# Patient Record
Sex: MALE | Race: BLACK OR AFRICAN AMERICAN | NOT HISPANIC OR LATINO | Employment: FULL TIME | URBAN - METROPOLITAN AREA
[De-identification: names, ages, dates, MRNs, and addresses within clinical notes are randomized per-mention and may not be internally consistent; named-entity substitution may affect disease eponyms.]

---

## 2023-08-07 NOTE — PROGRESS NOTES
Assessment:       1. Preventative health care    2. Chronic gout without tophus, unspecified cause, unspecified site    3. Sickle cell trait    4. Need for vaccination              Plan:             Yassine was seen today for establish care.    Diagnoses and all orders for this visit:    Preventative health care  -     Hepatitis C Antibody; Future  -     HIV 1/2 Ag/Ab (4th Gen); Future  -     RPR; Future  -     Hemoglobin A1C; Future  -     Lipid Panel; Future  -     CBC Auto Differential; Future  -     Basic Metabolic Panel; Future  -     Hepatic Function Panel; Future  -     TSH; Future    Chronic gout without tophus, unspecified cause, unspecified site  -     Uric Acid; Future    Sickle cell trait    Need for vaccination  -- I reviewed the patient vaccine history and provided the risk benefits and side effects of the vaccine.   -- I provided the patient a VIS sheet on the vaccine.   -     Tdap Vaccine              Subjective:       Patient ID: Yassine Cote Sr is a 28 y.o. male.    Chief Complaint:   Establish Care      HPI    #Last visit/Previous Provider  This patient is new to me  Previously seen by No primary care provider on file.  Last visit was > 3yr  Last CPE was >3yr      Link to History           #Interim Hx    No urgent care or ED/hospitalization    #Concerns Today  Est care     Gout  3-4 yrs ago staretd with gout flares , has not had any work up , has gone to  for inj or steroid packs  Elevated bp   Not on meds . Does not have bp cuff at home   Heartburn  Reports history but never dx or dietary changes      #Chronic Problems    Patient Active Problem List   Diagnosis    Sickle cell trait    Chronic gout without tophus           Health Maintenance Due   Topic Date Due    Hepatitis C Screening  Never done    Lipid Panel  Never done    Pneumococcal Vaccines (Age 0-64) (1 - PCV) Never done    HIV Screening  Never done    COVID-19 Vaccine (2 - Pfizer series) 10/02/2021       Health Maintenance  "Topics with due status: Not Due       Topic Last Completion Date    TETANUS VACCINE 08/08/2023    Influenza Vaccine Not Due         Tobacco Use: Low Risk  (8/8/2023)    Patient History     Smoking Tobacco Use: Never     Smokeless Tobacco Use: Never     Passive Exposure: Never         Review of Systems   All other systems reviewed and are negative.        Objective:   /80 (BP Location: Right arm, Patient Position: Sitting, BP Method: Large (Manual))   Pulse 80   Ht 6' 2" (1.88 m)   Wt (!) 142 kg (313 lb 0.9 oz)   SpO2 98%   BMI 40.19 kg/m²     Vitals 8/8/2023   Height 74   Weight (lbs) 313.05   BMI (kg/m2) 40.2          Physical Exam  Vitals and nursing note reviewed.   Constitutional:       General: He is not in acute distress.     Appearance: He is well-developed. He is not ill-appearing, toxic-appearing or diaphoretic.   HENT:      Head: Normocephalic.   Eyes:      Conjunctiva/sclera: Conjunctivae normal.   Cardiovascular:      Rate and Rhythm: Normal rate and regular rhythm.      Heart sounds: Normal heart sounds. No murmur heard.     No friction rub. No gallop.   Pulmonary:      Effort: Pulmonary effort is normal. No respiratory distress.   Abdominal:      General: There is no distension.      Palpations: Abdomen is soft.   Neurological:      General: No focal deficit present.      Mental Status: He is alert and oriented to person, place, and time.             ASCVD  The ASCVD Risk score (Dano DK, et al., 2019) failed to calculate for the following reasons:    The 2019 ASCVD risk score is only valid for ages 40 to 79    Basic Labs  BMP  No results found for: "NA", "K", "CL", "CO2", "BUN", "CREATININE", "CALCIUM", "ANIONGAP", "ESTGFRAFRICA", "EGFRNONAA"  No results found for: "ALT", "AST", "GGT", "ALKPHOS", "BILITOT"    No results found for: "TSH"    No results found for: "WBC", "HGB", "HCT", "MCV", "PLT"        STD  No results found for: "HIV1X2", "ASF15GKHG"  No results found for: "RPR"  No results " "found for: "HAV", "HEPAIGM", "HEPBIGM", "HEPBCAB", "HBEAG", "HEPCAB"  No results found for: "LABNGO", "LABCHLA"      Lipids  No results found for: "CHOL"  No results found for: "HDL"  No results found for: "LDLCALC"  No results found for: "TRIG"  No results found for: "CHOLHDL"    DM  No results found for: "LABA1C", "HGBA1C"    PSA  No results found for: "PSA", "PSATOTAL", "PSAFREE", "PSAFREEPCT"        Future Appointments   Date Time Provider Department [place]   8/8/2023  3:30 PM [facility]AV[facility]   8/22/2023  2:00 PM INJECTION, HEAVEN [facility] [place]   9/5/2023  3:40 PM Mikey Morelos III, MD [facility]  [place]         Disclaimer:  This note has been generated using voice-recognition software. There may be grammatical or spelling errors that have been missed during proof-reading     "

## 2023-08-08 ENCOUNTER — LAB VISIT (OUTPATIENT)
Dept: LAB | Facility: HOSPITAL | Age: 28
End: 2023-08-08
Attending: INTERNAL MEDICINE
Payer: COMMERCIAL

## 2023-08-08 ENCOUNTER — OFFICE VISIT (OUTPATIENT)
Dept: INTERNAL MEDICINE | Facility: CLINIC | Age: 28
End: 2023-08-08
Payer: COMMERCIAL

## 2023-08-08 VITALS
SYSTOLIC BLOOD PRESSURE: 132 MMHG | OXYGEN SATURATION: 98 % | HEART RATE: 80 BPM | DIASTOLIC BLOOD PRESSURE: 80 MMHG | BODY MASS INDEX: 40.18 KG/M2 | WEIGHT: 313.06 LBS | HEIGHT: 74 IN

## 2023-08-08 DIAGNOSIS — Z00.00 PREVENTATIVE HEALTH CARE: Primary | ICD-10-CM

## 2023-08-08 DIAGNOSIS — M1A.9XX0 CHRONIC GOUT WITHOUT TOPHUS, UNSPECIFIED CAUSE, UNSPECIFIED SITE: ICD-10-CM

## 2023-08-08 DIAGNOSIS — Z00.00 PREVENTATIVE HEALTH CARE: ICD-10-CM

## 2023-08-08 DIAGNOSIS — Z23 NEED FOR VACCINATION: ICD-10-CM

## 2023-08-08 DIAGNOSIS — D57.3 SICKLE CELL TRAIT: ICD-10-CM

## 2023-08-08 PROCEDURE — 3079F DIAST BP 80-89 MM HG: CPT | Mod: CPTII,S$GLB,, | Performed by: INTERNAL MEDICINE

## 2023-08-08 PROCEDURE — 90715 TDAP VACCINE GREATER THAN OR EQUAL TO 7YO IM: ICD-10-PCS | Mod: S$GLB,,, | Performed by: INTERNAL MEDICINE

## 2023-08-08 PROCEDURE — 99385 PREV VISIT NEW AGE 18-39: CPT | Mod: 25,S$GLB,, | Performed by: INTERNAL MEDICINE

## 2023-08-08 PROCEDURE — 90715 TDAP VACCINE 7 YRS/> IM: CPT | Mod: S$GLB,,, | Performed by: INTERNAL MEDICINE

## 2023-08-08 PROCEDURE — 3008F BODY MASS INDEX DOCD: CPT | Mod: CPTII,S$GLB,, | Performed by: INTERNAL MEDICINE

## 2023-08-08 PROCEDURE — 1160F PR REVIEW ALL MEDS BY PRESCRIBER/CLIN PHARMACIST DOCUMENTED: ICD-10-PCS | Mod: CPTII,S$GLB,, | Performed by: INTERNAL MEDICINE

## 2023-08-08 PROCEDURE — 1160F RVW MEDS BY RX/DR IN RCRD: CPT | Mod: CPTII,S$GLB,, | Performed by: INTERNAL MEDICINE

## 2023-08-08 PROCEDURE — 3075F PR MOST RECENT SYSTOLIC BLOOD PRESS GE 130-139MM HG: ICD-10-PCS | Mod: CPTII,S$GLB,, | Performed by: INTERNAL MEDICINE

## 2023-08-08 PROCEDURE — 86592 SYPHILIS TEST NON-TREP QUAL: CPT | Performed by: INTERNAL MEDICINE

## 2023-08-08 PROCEDURE — 80076 HEPATIC FUNCTION PANEL: CPT | Performed by: INTERNAL MEDICINE

## 2023-08-08 PROCEDURE — 80061 LIPID PANEL: CPT | Performed by: INTERNAL MEDICINE

## 2023-08-08 PROCEDURE — 84550 ASSAY OF BLOOD/URIC ACID: CPT | Performed by: INTERNAL MEDICINE

## 2023-08-08 PROCEDURE — 90471 IMMUNIZATION ADMIN: CPT | Mod: S$GLB,,, | Performed by: INTERNAL MEDICINE

## 2023-08-08 PROCEDURE — 1159F PR MEDICATION LIST DOCUMENTED IN MEDICAL RECORD: ICD-10-PCS | Mod: CPTII,S$GLB,, | Performed by: INTERNAL MEDICINE

## 2023-08-08 PROCEDURE — 86803 HEPATITIS C AB TEST: CPT | Performed by: INTERNAL MEDICINE

## 2023-08-08 PROCEDURE — 85025 COMPLETE CBC W/AUTO DIFF WBC: CPT | Performed by: INTERNAL MEDICINE

## 2023-08-08 PROCEDURE — 99999 PR PBB SHADOW E&M-NEW PATIENT-LVL IV: ICD-10-PCS | Mod: PBBFAC,,, | Performed by: INTERNAL MEDICINE

## 2023-08-08 PROCEDURE — 3008F PR BODY MASS INDEX (BMI) DOCUMENTED: ICD-10-PCS | Mod: CPTII,S$GLB,, | Performed by: INTERNAL MEDICINE

## 2023-08-08 PROCEDURE — 3079F PR MOST RECENT DIASTOLIC BLOOD PRESSURE 80-89 MM HG: ICD-10-PCS | Mod: CPTII,S$GLB,, | Performed by: INTERNAL MEDICINE

## 2023-08-08 PROCEDURE — 3075F SYST BP GE 130 - 139MM HG: CPT | Mod: CPTII,S$GLB,, | Performed by: INTERNAL MEDICINE

## 2023-08-08 PROCEDURE — 84443 ASSAY THYROID STIM HORMONE: CPT | Performed by: INTERNAL MEDICINE

## 2023-08-08 PROCEDURE — 83036 HEMOGLOBIN GLYCOSYLATED A1C: CPT | Performed by: INTERNAL MEDICINE

## 2023-08-08 PROCEDURE — 99999 PR PBB SHADOW E&M-NEW PATIENT-LVL IV: CPT | Mod: PBBFAC,,, | Performed by: INTERNAL MEDICINE

## 2023-08-08 PROCEDURE — 90471 TDAP VACCINE GREATER THAN OR EQUAL TO 7YO IM: ICD-10-PCS | Mod: S$GLB,,, | Performed by: INTERNAL MEDICINE

## 2023-08-08 PROCEDURE — 80048 BASIC METABOLIC PNL TOTAL CA: CPT | Performed by: INTERNAL MEDICINE

## 2023-08-08 PROCEDURE — 1159F MED LIST DOCD IN RCRD: CPT | Mod: CPTII,S$GLB,, | Performed by: INTERNAL MEDICINE

## 2023-08-08 PROCEDURE — 99385 PR PREVENTIVE VISIT,NEW,18-39: ICD-10-PCS | Mod: 25,S$GLB,, | Performed by: INTERNAL MEDICINE

## 2023-08-08 PROCEDURE — 87389 HIV-1 AG W/HIV-1&-2 AB AG IA: CPT | Performed by: INTERNAL MEDICINE

## 2023-08-08 PROCEDURE — 36415 COLL VENOUS BLD VENIPUNCTURE: CPT | Mod: PO | Performed by: INTERNAL MEDICINE

## 2023-08-08 NOTE — PATIENT INSTRUCTIONS
We were happy to see you today    For your Testing  Please have your labs and/or imaging test done today         For your Medication   Please see the diets attached and start tracking the blood pressure   For more information about side effects please visit medlineplus.gov          For your Vaccinations  We gave your the following vaccines  Tetanus       Please return to clinic in    Follow up for Labs Today, 4 week for physical, , Blood pressure log.        Extra resources

## 2023-08-09 LAB
ALBUMIN SERPL BCP-MCNC: 3.5 G/DL (ref 3.5–5.2)
ALP SERPL-CCNC: 71 U/L (ref 55–135)
ALT SERPL W/O P-5'-P-CCNC: 56 U/L (ref 10–44)
ANION GAP SERPL CALC-SCNC: 9 MMOL/L (ref 8–16)
AST SERPL-CCNC: 18 U/L (ref 10–40)
BASOPHILS # BLD AUTO: 0.08 K/UL (ref 0–0.2)
BASOPHILS NFR BLD: 0.4 % (ref 0–1.9)
BILIRUB DIRECT SERPL-MCNC: 0.2 MG/DL (ref 0.1–0.3)
BILIRUB SERPL-MCNC: 0.4 MG/DL (ref 0.1–1)
BUN SERPL-MCNC: 21 MG/DL (ref 6–20)
CALCIUM SERPL-MCNC: 9.3 MG/DL (ref 8.7–10.5)
CHLORIDE SERPL-SCNC: 102 MMOL/L (ref 95–110)
CHOLEST SERPL-MCNC: 198 MG/DL (ref 120–199)
CHOLEST/HDLC SERPL: 4 {RATIO} (ref 2–5)
CO2 SERPL-SCNC: 27 MMOL/L (ref 23–29)
CREAT SERPL-MCNC: 0.9 MG/DL (ref 0.5–1.4)
DIFFERENTIAL METHOD: ABNORMAL
EOSINOPHIL # BLD AUTO: 0 K/UL (ref 0–0.5)
EOSINOPHIL NFR BLD: 0 % (ref 0–8)
ERYTHROCYTE [DISTWIDTH] IN BLOOD BY AUTOMATED COUNT: 13.2 % (ref 11.5–14.5)
EST. GFR  (NO RACE VARIABLE): >60 ML/MIN/1.73 M^2
ESTIMATED AVG GLUCOSE: 117 MG/DL (ref 68–131)
GLUCOSE SERPL-MCNC: 55 MG/DL (ref 70–110)
HBA1C MFR BLD: 5.7 % (ref 4–5.6)
HCT VFR BLD AUTO: 48.4 % (ref 40–54)
HCV AB SERPL QL IA: NORMAL
HDLC SERPL-MCNC: 50 MG/DL (ref 40–75)
HDLC SERPL: 25.3 % (ref 20–50)
HGB BLD-MCNC: 16 G/DL (ref 14–18)
HIV 1+2 AB+HIV1 P24 AG SERPL QL IA: NORMAL
IMM GRANULOCYTES # BLD AUTO: 0.56 K/UL (ref 0–0.04)
IMM GRANULOCYTES NFR BLD AUTO: 2.7 % (ref 0–0.5)
LDLC SERPL CALC-MCNC: 122.8 MG/DL (ref 63–159)
LYMPHOCYTES # BLD AUTO: 3.8 K/UL (ref 1–4.8)
LYMPHOCYTES NFR BLD: 18.6 % (ref 18–48)
MCH RBC QN AUTO: 28 PG (ref 27–31)
MCHC RBC AUTO-ENTMCNC: 33.1 G/DL (ref 32–36)
MCV RBC AUTO: 85 FL (ref 82–98)
MONOCYTES # BLD AUTO: 1.6 K/UL (ref 0.3–1)
MONOCYTES NFR BLD: 7.6 % (ref 4–15)
NEUTROPHILS # BLD AUTO: 14.5 K/UL (ref 1.8–7.7)
NEUTROPHILS NFR BLD: 70.7 % (ref 38–73)
NONHDLC SERPL-MCNC: 148 MG/DL
NRBC BLD-RTO: 0 /100 WBC
PLATELET # BLD AUTO: 411 K/UL (ref 150–450)
PMV BLD AUTO: 8.7 FL (ref 9.2–12.9)
POTASSIUM SERPL-SCNC: 4 MMOL/L (ref 3.5–5.1)
PROT SERPL-MCNC: 7.3 G/DL (ref 6–8.4)
RBC # BLD AUTO: 5.72 M/UL (ref 4.6–6.2)
RPR SER QL: NORMAL
SODIUM SERPL-SCNC: 138 MMOL/L (ref 136–145)
TRIGL SERPL-MCNC: 126 MG/DL (ref 30–150)
TSH SERPL DL<=0.005 MIU/L-ACNC: 1.58 UIU/ML (ref 0.4–4)
URATE SERPL-MCNC: 7.6 MG/DL (ref 3.4–7)
WBC # BLD AUTO: 20.46 K/UL (ref 3.9–12.7)

## 2023-08-21 ENCOUNTER — PATIENT MESSAGE (OUTPATIENT)
Dept: RESEARCH | Facility: HOSPITAL | Age: 28
End: 2023-08-21
Payer: COMMERCIAL

## 2023-08-22 ENCOUNTER — CLINICAL SUPPORT (OUTPATIENT)
Dept: FAMILY MEDICINE | Facility: CLINIC | Age: 28
End: 2023-08-22
Payer: COMMERCIAL

## 2023-08-22 VITALS — DIASTOLIC BLOOD PRESSURE: 88 MMHG | SYSTOLIC BLOOD PRESSURE: 138 MMHG

## 2023-08-22 DIAGNOSIS — I10 HYPERTENSION, UNSPECIFIED TYPE: Primary | ICD-10-CM

## 2023-08-22 PROCEDURE — 99999 PR PBB SHADOW E&M-EST. PATIENT-LVL II: CPT | Mod: PBBFAC,,,

## 2023-08-22 PROCEDURE — 99999 PR PBB SHADOW E&M-EST. PATIENT-LVL II: ICD-10-PCS | Mod: PBBFAC,,,

## 2023-08-22 NOTE — PROGRESS NOTES
Yassine Cote Sr 28 y.o. male is here today for Blood Pressure check.   History of HTN no.    Review of patient's allergies indicates:  No Known Allergies  Creatinine   Date Value Ref Range Status   08/08/2023 0.9 0.5 - 1.4 mg/dL Final     Sodium   Date Value Ref Range Status   08/08/2023 138 136 - 145 mmol/L Final     Potassium   Date Value Ref Range Status   08/08/2023 4.0 3.5 - 5.1 mmol/L Final   ]  Patient denies taking blood pressure medications on a regular basis at the same time of the day.   No current outpatient medications on file.  Does patient have record of home blood pressure readings no. Readings have been averaging N/A.   Last dose of blood pressure medication was taken at N/A.  Patient is asymptomatic.   Complains of N/A.    BP: 138/88 ,   .    Blood pressure reading after 15 minutes was 138/88, Pulse 80.  Dr. Morelos notified.

## 2023-09-04 PROBLEM — R73.03 PREDIABETES: Status: ACTIVE | Noted: 2023-09-04

## 2023-09-04 NOTE — PROGRESS NOTES
Assessment:       1. Prediabetes    2. Leukocytosis, unspecified type    3. Elevated LFTs    4. Chronic gout without tophus, unspecified cause, unspecified site          Plan:         Yassine was seen today for follow-up.    Diagnoses and all orders for this visit:    Prediabetes  new disease state  I reviewed the natural history of the disorder and possible complications  Reviewed the consequence of non-adherence to treatment regiment  I have reviewed lifestyle modification to achieve/maintain goals  Reviewed goals for tx  reviewed signs and symptoms that should prompt return to provider or evaluation in the ED   Patient will follow up in 6 months   -     Basic Metabolic Panel; Standing  -     Hemoglobin A1C; Standing  -     Lipid Panel; Standing    Leukocytosis, unspecified type    New   Uncontrolled  Signs, symptoms consistent with unclear etiology , possible meication side effect pt on steroid for gout   history or pyhsical exam do not suggest acute infections   I provided instruction on supportive care measures   Prior tesing reviewed and new testing was was given   reviewed signs and symptoms that should prompt return to provider or evaluation in the ED  -     CBC Auto Differential; Future  -     Pathologist Interpretation Differential; Future  -     CBC Without Differential; Standing    Elevated LFTs    New   Uncontrolled  Signs, symptoms consistent with unclear etiology but possible etoh related pt has >6 drinks on weekends   history or pyhsical exam do not suggest ADLF  DDx includes but not limited to fatty liver,   I provided instruction on supportive care measures   Prior tesing reviewed and new testing was was given   reviewed signs and symptoms that should prompt return to provider or evaluation in the ED  -     Comprehensive Metabolic Panel; Future  -     Hepatic Function Panel; Standing    Chronic gout without tophus, unspecified cause, unspecified site  -     Comprehensive Metabolic Panel; Future  -    "  Uric Acid; Standing  -     colchicine, gout, (COLCRYS) 0.6 mg tablet; Take 1 tablet (0.6 mg total) by mouth 2 (two) times daily. Take 2 tabs at the first sign of symptoms. Take one tab twice a day til symptoms resolution          Subjective:       Patient ID: Yassine Cote Sr is a 28 y.o. male.    Chief Complaint: Follow-up        Interim Hx  none    Concerns today  Review results     Chronic problems     Patient Active Problem List   Diagnosis    Sickle cell trait    Chronic gout without tophus    Prediabetes         HPI    Review of Systems   All other systems reviewed and are negative.            Health Maintenance Due   Topic Date Due    Pneumococcal Vaccines (Age 0-64) (1 - PCV) Never done    COVID-19 Vaccine (2 - Pfizer series) 10/02/2021    Influenza Vaccine (1) Never done         Objective:     /80 (BP Location: Right arm, Patient Position: Sitting, BP Method: Large (Manual))   Pulse 77   Ht 6' 2" (1.88 m)   Wt (!) 142.8 kg (314 lb 13.1 oz)   SpO2 98%   BMI 40.42 kg/m²         9/5/2023     3:48 PM 8/8/2023     2:31 PM   Vitals   Height 6' 2" (1.88 m) 6' 2" (1.88 m)   Weight (lbs) 314.82 313.05   BMI (kg/m2) 40.4 40.2              Physical Exam  Vitals and nursing note reviewed.   Constitutional:       General: He is not in acute distress.     Appearance: He is well-developed. He is not ill-appearing, toxic-appearing or diaphoretic.   HENT:      Head: Normocephalic.   Eyes:      Conjunctiva/sclera: Conjunctivae normal.   Cardiovascular:      Rate and Rhythm: Normal rate and regular rhythm.      Heart sounds: Normal heart sounds. No murmur heard.     No friction rub. No gallop.   Pulmonary:      Effort: Pulmonary effort is normal. No respiratory distress.   Abdominal:      General: There is no distension.      Palpations: Abdomen is soft.   Neurological:      General: No focal deficit present.      Mental Status: He is alert and oriented to person, place, and time.           No results " found for this or any previous visit (from the past 336 hour(s)).    Future Appointments   Date Time Provider Department Center   3/1/2024  8:00 AM LAB, HEAVEN [facility] [place]   3/5/2024  2:20 PM Mikey Morelos III, MD [facility] [place]           Disclaimer:  This note has been generated using voice-recognition software. There may be grammatical or spelling errors that have been missed during proof-reading the

## 2023-09-05 ENCOUNTER — OFFICE VISIT (OUTPATIENT)
Dept: INTERNAL MEDICINE | Facility: CLINIC | Age: 28
End: 2023-09-05
Payer: COMMERCIAL

## 2023-09-05 ENCOUNTER — LAB VISIT (OUTPATIENT)
Dept: LAB | Facility: HOSPITAL | Age: 28
End: 2023-09-05
Attending: INTERNAL MEDICINE
Payer: COMMERCIAL

## 2023-09-05 VITALS
HEIGHT: 74 IN | OXYGEN SATURATION: 98 % | DIASTOLIC BLOOD PRESSURE: 80 MMHG | WEIGHT: 314.81 LBS | BODY MASS INDEX: 40.4 KG/M2 | HEART RATE: 77 BPM | SYSTOLIC BLOOD PRESSURE: 132 MMHG

## 2023-09-05 DIAGNOSIS — R79.89 ELEVATED LFTS: ICD-10-CM

## 2023-09-05 DIAGNOSIS — M1A.9XX0 CHRONIC GOUT WITHOUT TOPHUS, UNSPECIFIED CAUSE, UNSPECIFIED SITE: ICD-10-CM

## 2023-09-05 DIAGNOSIS — R73.03 PREDIABETES: Primary | ICD-10-CM

## 2023-09-05 DIAGNOSIS — D72.829 LEUKOCYTOSIS, UNSPECIFIED TYPE: ICD-10-CM

## 2023-09-05 LAB
ALBUMIN SERPL BCP-MCNC: 3.8 G/DL (ref 3.5–5.2)
ALP SERPL-CCNC: 67 U/L (ref 55–135)
ALT SERPL W/O P-5'-P-CCNC: 21 U/L (ref 10–44)
ANION GAP SERPL CALC-SCNC: 11 MMOL/L (ref 8–16)
AST SERPL-CCNC: 17 U/L (ref 10–40)
BILIRUB SERPL-MCNC: 0.3 MG/DL (ref 0.1–1)
BUN SERPL-MCNC: 13 MG/DL (ref 6–20)
CALCIUM SERPL-MCNC: 8.8 MG/DL (ref 8.7–10.5)
CHLORIDE SERPL-SCNC: 105 MMOL/L (ref 95–110)
CO2 SERPL-SCNC: 23 MMOL/L (ref 23–29)
CREAT SERPL-MCNC: 1.1 MG/DL (ref 0.5–1.4)
EST. GFR  (NO RACE VARIABLE): >60 ML/MIN/1.73 M^2
GLUCOSE SERPL-MCNC: 92 MG/DL (ref 70–110)
PATH REV BLD -IMP: NORMAL
POTASSIUM SERPL-SCNC: 4.1 MMOL/L (ref 3.5–5.1)
PROT SERPL-MCNC: 7.2 G/DL (ref 6–8.4)
SODIUM SERPL-SCNC: 139 MMOL/L (ref 136–145)

## 2023-09-05 PROCEDURE — 1159F PR MEDICATION LIST DOCUMENTED IN MEDICAL RECORD: ICD-10-PCS | Mod: CPTII,S$GLB,, | Performed by: INTERNAL MEDICINE

## 2023-09-05 PROCEDURE — 3075F PR MOST RECENT SYSTOLIC BLOOD PRESS GE 130-139MM HG: ICD-10-PCS | Mod: CPTII,S$GLB,, | Performed by: INTERNAL MEDICINE

## 2023-09-05 PROCEDURE — 99214 PR OFFICE/OUTPT VISIT, EST, LEVL IV, 30-39 MIN: ICD-10-PCS | Mod: S$GLB,,, | Performed by: INTERNAL MEDICINE

## 2023-09-05 PROCEDURE — 3079F PR MOST RECENT DIASTOLIC BLOOD PRESSURE 80-89 MM HG: ICD-10-PCS | Mod: CPTII,S$GLB,, | Performed by: INTERNAL MEDICINE

## 2023-09-05 PROCEDURE — 36415 COLL VENOUS BLD VENIPUNCTURE: CPT | Mod: PO | Performed by: INTERNAL MEDICINE

## 2023-09-05 PROCEDURE — 1159F MED LIST DOCD IN RCRD: CPT | Mod: CPTII,S$GLB,, | Performed by: INTERNAL MEDICINE

## 2023-09-05 PROCEDURE — 99999 PR PBB SHADOW E&M-EST. PATIENT-LVL IV: CPT | Mod: PBBFAC,,, | Performed by: INTERNAL MEDICINE

## 2023-09-05 PROCEDURE — 3075F SYST BP GE 130 - 139MM HG: CPT | Mod: CPTII,S$GLB,, | Performed by: INTERNAL MEDICINE

## 2023-09-05 PROCEDURE — 99999 PR PBB SHADOW E&M-EST. PATIENT-LVL IV: ICD-10-PCS | Mod: PBBFAC,,, | Performed by: INTERNAL MEDICINE

## 2023-09-05 PROCEDURE — 3044F PR MOST RECENT HEMOGLOBIN A1C LEVEL <7.0%: ICD-10-PCS | Mod: CPTII,S$GLB,, | Performed by: INTERNAL MEDICINE

## 2023-09-05 PROCEDURE — 3079F DIAST BP 80-89 MM HG: CPT | Mod: CPTII,S$GLB,, | Performed by: INTERNAL MEDICINE

## 2023-09-05 PROCEDURE — 3044F HG A1C LEVEL LT 7.0%: CPT | Mod: CPTII,S$GLB,, | Performed by: INTERNAL MEDICINE

## 2023-09-05 PROCEDURE — 85025 COMPLETE CBC W/AUTO DIFF WBC: CPT | Performed by: INTERNAL MEDICINE

## 2023-09-05 PROCEDURE — 3008F BODY MASS INDEX DOCD: CPT | Mod: CPTII,S$GLB,, | Performed by: INTERNAL MEDICINE

## 2023-09-05 PROCEDURE — 85060 PATHOLOGIST REVIEW: ICD-10-PCS | Mod: ,,, | Performed by: PATHOLOGY

## 2023-09-05 PROCEDURE — 80053 COMPREHEN METABOLIC PANEL: CPT | Performed by: INTERNAL MEDICINE

## 2023-09-05 PROCEDURE — 99214 OFFICE O/P EST MOD 30 MIN: CPT | Mod: S$GLB,,, | Performed by: INTERNAL MEDICINE

## 2023-09-05 PROCEDURE — 3008F PR BODY MASS INDEX (BMI) DOCUMENTED: ICD-10-PCS | Mod: CPTII,S$GLB,, | Performed by: INTERNAL MEDICINE

## 2023-09-05 PROCEDURE — 85060 BLOOD SMEAR INTERPRETATION: CPT | Mod: ,,, | Performed by: PATHOLOGY

## 2023-09-05 RX ORDER — COLCHICINE 0.6 MG/1
0.6 TABLET ORAL 2 TIMES DAILY
Qty: 60 TABLET | Refills: 0 | Status: SHIPPED | OUTPATIENT
Start: 2023-09-05

## 2023-09-05 NOTE — PATIENT INSTRUCTIONS
We were happy to see you today    For your Testing  Please have your labs and/or imaging test done  today and prior to next visit         For your Medication   We sent colchicine to pharmacy   For more information about side effects please visit medlineplus.gov        For your Referrals  None             Please return to clinic in    Follow up for LABS Today, 6 months Office Visit with prelabs.        Extra resources

## 2023-09-06 LAB
BASOPHILS # BLD AUTO: 0.07 K/UL (ref 0–0.2)
BASOPHILS NFR BLD: 1.2 % (ref 0–1.9)
DIFFERENTIAL METHOD: ABNORMAL
EOSINOPHIL # BLD AUTO: 0.1 K/UL (ref 0–0.5)
EOSINOPHIL NFR BLD: 1.4 % (ref 0–8)
ERYTHROCYTE [DISTWIDTH] IN BLOOD BY AUTOMATED COUNT: 12.1 % (ref 11.5–14.5)
HCT VFR BLD AUTO: 44.9 % (ref 40–54)
HGB BLD-MCNC: 14.8 G/DL (ref 14–18)
IMM GRANULOCYTES # BLD AUTO: 0.01 K/UL (ref 0–0.04)
IMM GRANULOCYTES NFR BLD AUTO: 0.2 % (ref 0–0.5)
LYMPHOCYTES # BLD AUTO: 2.5 K/UL (ref 1–4.8)
LYMPHOCYTES NFR BLD: 42.9 % (ref 18–48)
MCH RBC QN AUTO: 27.7 PG (ref 27–31)
MCHC RBC AUTO-ENTMCNC: 33 G/DL (ref 32–36)
MCV RBC AUTO: 84 FL (ref 82–98)
MONOCYTES # BLD AUTO: 0.6 K/UL (ref 0.3–1)
MONOCYTES NFR BLD: 10 % (ref 4–15)
NEUTROPHILS # BLD AUTO: 2.6 K/UL (ref 1.8–7.7)
NEUTROPHILS NFR BLD: 44.3 % (ref 38–73)
NRBC BLD-RTO: 0 /100 WBC
PLATELET # BLD AUTO: 331 K/UL (ref 150–450)
PMV BLD AUTO: 9.1 FL (ref 9.2–12.9)
RBC # BLD AUTO: 5.34 M/UL (ref 4.6–6.2)
WBC # BLD AUTO: 5.78 K/UL (ref 3.9–12.7)

## 2023-09-07 LAB — PATH REV BLD -IMP: NORMAL

## 2024-03-04 PROBLEM — E66.813 CLASS 3 OBESITY: Status: ACTIVE | Noted: 2024-03-04

## 2024-03-04 PROBLEM — E66.01 CLASS 3 OBESITY: Status: ACTIVE | Noted: 2024-03-04

## 2024-03-07 ENCOUNTER — OFFICE VISIT (OUTPATIENT)
Dept: INTERNAL MEDICINE | Facility: CLINIC | Age: 29
End: 2024-03-07
Payer: COMMERCIAL

## 2024-03-07 ENCOUNTER — HOSPITAL ENCOUNTER (OUTPATIENT)
Dept: RADIOLOGY | Facility: HOSPITAL | Age: 29
Discharge: HOME | End: 2024-03-07
Attending: INTERNAL MEDICINE
Payer: COMMERCIAL

## 2024-03-07 ENCOUNTER — LAB VISIT (OUTPATIENT)
Dept: LAB | Facility: HOSPITAL | Age: 29
End: 2024-03-07
Attending: INTERNAL MEDICINE
Payer: COMMERCIAL

## 2024-03-07 VITALS
SYSTOLIC BLOOD PRESSURE: 126 MMHG | DIASTOLIC BLOOD PRESSURE: 86 MMHG | HEART RATE: 89 BPM | HEIGHT: 74 IN | BODY MASS INDEX: 40.43 KG/M2 | WEIGHT: 315 LBS | OXYGEN SATURATION: 95 %

## 2024-03-07 DIAGNOSIS — D57.3 SICKLE CELL TRAIT: ICD-10-CM

## 2024-03-07 DIAGNOSIS — M79.646 PAIN OF MIDDLE FINGER: ICD-10-CM

## 2024-03-07 DIAGNOSIS — Z00.00 PREVENTATIVE HEALTH CARE: ICD-10-CM

## 2024-03-07 DIAGNOSIS — S62.663A CLOSED NONDISPLACED FRACTURE OF DISTAL PHALANX OF LEFT MIDDLE FINGER, INITIAL ENCOUNTER: ICD-10-CM

## 2024-03-07 DIAGNOSIS — Z23 NEED FOR VACCINATION: ICD-10-CM

## 2024-03-07 DIAGNOSIS — Z00.00 PREVENTATIVE HEALTH CARE: Primary | ICD-10-CM

## 2024-03-07 DIAGNOSIS — E66.01 CLASS 3 OBESITY: ICD-10-CM

## 2024-03-07 LAB
ALBUMIN SERPL BCP-MCNC: 3.9 G/DL (ref 3.5–5.2)
ALP SERPL-CCNC: 77 U/L (ref 55–135)
ALT SERPL W/O P-5'-P-CCNC: 42 U/L (ref 10–44)
ANION GAP SERPL CALC-SCNC: 10 MMOL/L (ref 8–16)
AST SERPL-CCNC: 23 U/L (ref 10–40)
BASOPHILS # BLD AUTO: 0.07 K/UL (ref 0–0.2)
BASOPHILS NFR BLD: 1.3 % (ref 0–1.9)
BILIRUB DIRECT SERPL-MCNC: 0.2 MG/DL (ref 0.1–0.3)
BILIRUB SERPL-MCNC: 0.4 MG/DL (ref 0.1–1)
BUN SERPL-MCNC: 15 MG/DL (ref 6–20)
CALCIUM SERPL-MCNC: 9.4 MG/DL (ref 8.7–10.5)
CHLORIDE SERPL-SCNC: 107 MMOL/L (ref 95–110)
CHOLEST SERPL-MCNC: 160 MG/DL (ref 120–199)
CHOLEST/HDLC SERPL: 5 {RATIO} (ref 2–5)
CO2 SERPL-SCNC: 24 MMOL/L (ref 23–29)
CREAT SERPL-MCNC: 1.2 MG/DL (ref 0.5–1.4)
DIFFERENTIAL METHOD BLD: NORMAL
EOSINOPHIL # BLD AUTO: 0.1 K/UL (ref 0–0.5)
EOSINOPHIL NFR BLD: 2.2 % (ref 0–8)
ERYTHROCYTE [DISTWIDTH] IN BLOOD BY AUTOMATED COUNT: 12.8 % (ref 11.5–14.5)
EST. GFR  (NO RACE VARIABLE): >60 ML/MIN/1.73 M^2
ESTIMATED AVG GLUCOSE: 114 MG/DL (ref 68–131)
GLUCOSE SERPL-MCNC: 94 MG/DL (ref 70–110)
HBA1C MFR BLD: 5.6 % (ref 4–5.6)
HCT VFR BLD AUTO: 45.7 % (ref 40–54)
HDLC SERPL-MCNC: 32 MG/DL (ref 40–75)
HDLC SERPL: 20 % (ref 20–50)
HGB BLD-MCNC: 15.1 G/DL (ref 14–18)
IMM GRANULOCYTES # BLD AUTO: 0.02 K/UL (ref 0–0.04)
IMM GRANULOCYTES NFR BLD AUTO: 0.4 % (ref 0–0.5)
LDLC SERPL CALC-MCNC: 73.6 MG/DL (ref 63–159)
LYMPHOCYTES # BLD AUTO: 1.9 K/UL (ref 1–4.8)
LYMPHOCYTES NFR BLD: 35.3 % (ref 18–48)
MCH RBC QN AUTO: 27.8 PG (ref 27–31)
MCHC RBC AUTO-ENTMCNC: 33 G/DL (ref 32–36)
MCV RBC AUTO: 84 FL (ref 82–98)
MONOCYTES # BLD AUTO: 0.5 K/UL (ref 0.3–1)
MONOCYTES NFR BLD: 8.3 % (ref 4–15)
NEUTROPHILS # BLD AUTO: 2.8 K/UL (ref 1.8–7.7)
NEUTROPHILS NFR BLD: 52.5 % (ref 38–73)
NONHDLC SERPL-MCNC: 128 MG/DL
NRBC BLD-RTO: 0 /100 WBC
PLATELET # BLD AUTO: 306 K/UL (ref 150–450)
PMV BLD AUTO: 9.4 FL (ref 9.2–12.9)
POTASSIUM SERPL-SCNC: 4.2 MMOL/L (ref 3.5–5.1)
PROT SERPL-MCNC: 7.4 G/DL (ref 6–8.4)
RBC # BLD AUTO: 5.43 M/UL (ref 4.6–6.2)
SODIUM SERPL-SCNC: 141 MMOL/L (ref 136–145)
TRIGL SERPL-MCNC: 272 MG/DL (ref 30–150)
WBC # BLD AUTO: 5.39 K/UL (ref 3.9–12.7)

## 2024-03-07 PROCEDURE — 86592 SYPHILIS TEST NON-TREP QUAL: CPT | Performed by: INTERNAL MEDICINE

## 2024-03-07 PROCEDURE — 80061 LIPID PANEL: CPT | Performed by: INTERNAL MEDICINE

## 2024-03-07 PROCEDURE — 90471 IMMUNIZATION ADMIN: CPT | Mod: PBBFAC,PO

## 2024-03-07 PROCEDURE — 85025 COMPLETE CBC W/AUTO DIFF WBC: CPT | Performed by: INTERNAL MEDICINE

## 2024-03-07 PROCEDURE — 83036 HEMOGLOBIN GLYCOSYLATED A1C: CPT | Performed by: INTERNAL MEDICINE

## 2024-03-07 PROCEDURE — 99214 OFFICE O/P EST MOD 30 MIN: CPT | Mod: PBBFAC,25,PO | Performed by: INTERNAL MEDICINE

## 2024-03-07 PROCEDURE — 90677 PCV20 VACCINE IM: CPT | Mod: PBBFAC,PO

## 2024-03-07 PROCEDURE — 80076 HEPATIC FUNCTION PANEL: CPT | Performed by: INTERNAL MEDICINE

## 2024-03-07 PROCEDURE — 80048 BASIC METABOLIC PNL TOTAL CA: CPT | Performed by: INTERNAL MEDICINE

## 2024-03-07 PROCEDURE — 73130 X-RAY EXAM OF HAND: CPT | Mod: 26,LT,, | Performed by: RADIOLOGY

## 2024-03-07 PROCEDURE — 36415 COLL VENOUS BLD VENIPUNCTURE: CPT | Mod: PO | Performed by: INTERNAL MEDICINE

## 2024-03-07 PROCEDURE — 99999 PR PBB SHADOW E&M-EST. PATIENT-LVL IV: CPT | Mod: PBBFAC,,, | Performed by: INTERNAL MEDICINE

## 2024-03-07 PROCEDURE — 73130 X-RAY EXAM OF HAND: CPT | Mod: TC,FY,LT

## 2024-03-07 NOTE — PROGRESS NOTES
Addendum   X-Ray Hand 3 View Left  Narrative: EXAMINATION:  XR HAND COMPLETE 3 VIEW LEFT    CLINICAL HISTORY:  Pain in unspecified finger(s)    COMPARISON:  None.    FINDINGS:  The alignment is within normal limits.  Fracture of volar aspect of base of LEFT 3rd middle phalanx.  This is age indeterminate.  Posterior marginal osteophytic spurring identified at the level of the dorsum of the LEFT 5th DIP.  Joint spaces are unremarkable.Soft tissues are unremarkable.  Impression: Age-indeterminate fracture volar aspect of base of LEFT 3rd middle phalanx with articular extension.  Correlation advised.    Electronically signed by: Milton Hart MD  Date:    03/07/2024  Time:    15:05        Closed nondisplaced fracture of distal phalanx of left middle finger, initial encounter  -     Ambulatory referral/consult to Hand Surgery; Future          Assessment:         1. Preventative health care    2. Class 3 obesity    3. Sickle cell trait    4. Pain of middle finger    5. Closed nondisplaced fracture of distal phalanx of left middle finger, initial encounter    6. Need for vaccination          Plan:           Yassine was seen today for follow-up and hand pain.    Diagnoses and all orders for this visit:    Preventative health care  -     Basic Metabolic Panel; Future  -     CBC Auto Differential; Future  -     Hepatic Function Panel; Future  -     Hemoglobin A1C; Future  -     Lipid Panel; Future  -     RPR; Future  -     Influenza - Quadrivalent (PF)    Class 3 obesity    Sickle cell trait    Pain of middle finger  -     X-Ray Hand 3 View Left; Future  -     Ambulatory referral/consult to Hand Surgery; Future    Closed nondisplaced fracture of distal phalanx of left middle finger, initial encounter  -     Ambulatory referral/consult to Hand Surgery; Future    Need for vaccination  -- I reviewed the patient vaccine history and provided the risk benefits and side effects of the vaccine.   -- I provided the patient a VIS  "sheet on the vaccine.   -     Pneumococcal Conjugate Vaccine (20 Valent) (IM)(Preferred)        Flu , Pv20  Labs today  Xray today   Fu in 4 weeks       Subjective:       Patient ID: Yassine Cote Sr is a 28 y.o. male.    Chief Complaint: Follow-up and Hand Pain          Interim Hx  3/1 - NO SHOWED LABS     Concerns today  Jammed finger when playing with child, 3 weeks, was swollen and difficulty bending     Chronic problems     HPI    Review of Systems   All other systems reviewed and are negative.            Health Maintenance Due   Topic Date Due    COVID-19 Vaccine (2 - 2023-24 season) 09/01/2023         Objective:     /86 (BP Location: Right arm, Patient Position: Sitting, BP Method: Large (Manual))   Pulse 89   Ht 6' 2" (1.88 m)   Wt (!) 144.2 kg (317 lb 14.5 oz)   SpO2 95%   BMI 40.82 kg/m²         3/7/2024     1:28 PM 9/5/2023     3:48 PM 8/8/2023     2:31 PM   Vitals   Height 6' 2" (1.88 m) 6' 2" (1.88 m) 6' 2" (1.88 m)   Weight (lbs) 317.9 314.82 313.05   BMI (kg/m2) 40.8 40.4 40.2       Tobacco Use: Low Risk  (3/8/2024)    Patient History     Smoking Tobacco Use: Never     Smokeless Tobacco Use: Never     Passive Exposure: Never              Physical Exam  Vitals and nursing note reviewed.   Constitutional:       General: He is not in acute distress.     Appearance: He is well-developed. He is not ill-appearing, toxic-appearing or diaphoretic.   HENT:      Head: Normocephalic.   Eyes:      Conjunctiva/sclera: Conjunctivae normal.   Cardiovascular:      Rate and Rhythm: Normal rate and regular rhythm.      Heart sounds: Normal heart sounds. No murmur heard.     No friction rub. No gallop.   Pulmonary:      Effort: Pulmonary effort is normal. No respiratory distress.   Abdominal:      General: There is no distension.      Palpations: Abdomen is soft.   Musculoskeletal:      Comments: Left middle finger pip swollen    Neurological:      General: No focal deficit present.      Mental " Status: He is alert and oriented to person, place, and time.           Recent Results (from the past 336 hour(s))   Basic Metabolic Panel    Collection Time: 03/07/24  2:09 PM   Result Value Ref Range    Sodium 141 136 - 145 mmol/L    Potassium 4.2 3.5 - 5.1 mmol/L    Chloride 107 95 - 110 mmol/L    CO2 24 23 - 29 mmol/L    Glucose 94 70 - 110 mg/dL    BUN 15 6 - 20 mg/dL    Creatinine 1.2 0.5 - 1.4 mg/dL    Calcium 9.4 8.7 - 10.5 mg/dL    Anion Gap 10 8 - 16 mmol/L    eGFR >60.0 >60 mL/min/1.73 m^2   CBC Auto Differential    Collection Time: 03/07/24  2:09 PM   Result Value Ref Range    WBC 5.39 3.90 - 12.70 K/uL    RBC 5.43 4.60 - 6.20 M/uL    Hemoglobin 15.1 14.0 - 18.0 g/dL    Hematocrit 45.7 40.0 - 54.0 %    MCV 84 82 - 98 fL    MCH 27.8 27.0 - 31.0 pg    MCHC 33.0 32.0 - 36.0 g/dL    RDW 12.8 11.5 - 14.5 %    Platelets 306 150 - 450 K/uL    MPV 9.4 9.2 - 12.9 fL    Immature Granulocytes 0.4 0.0 - 0.5 %    Gran # (ANC) 2.8 1.8 - 7.7 K/uL    Immature Grans (Abs) 0.02 0.00 - 0.04 K/uL    Lymph # 1.9 1.0 - 4.8 K/uL    Mono # 0.5 0.3 - 1.0 K/uL    Eos # 0.1 0.0 - 0.5 K/uL    Baso # 0.07 0.00 - 0.20 K/uL    nRBC 0 0 /100 WBC    Gran % 52.5 38.0 - 73.0 %    Lymph % 35.3 18.0 - 48.0 %    Mono % 8.3 4.0 - 15.0 %    Eosinophil % 2.2 0.0 - 8.0 %    Basophil % 1.3 0.0 - 1.9 %    Differential Method Automated    Hepatic Function Panel    Collection Time: 03/07/24  2:09 PM   Result Value Ref Range    Total Protein 7.4 6.0 - 8.4 g/dL    Albumin 3.9 3.5 - 5.2 g/dL    Total Bilirubin 0.4 0.1 - 1.0 mg/dL    Bilirubin, Direct 0.2 0.1 - 0.3 mg/dL    AST 23 10 - 40 U/L    ALT 42 10 - 44 U/L    Alkaline Phosphatase 77 55 - 135 U/L   Hemoglobin A1C    Collection Time: 03/07/24  2:09 PM   Result Value Ref Range    Hemoglobin A1C 5.6 4.0 - 5.6 %    Estimated Avg Glucose 114 68 - 131 mg/dL   Lipid Panel    Collection Time: 03/07/24  2:09 PM   Result Value Ref Range    Cholesterol 160 120 - 199 mg/dL    Triglycerides 272 (H) 30 - 150  mg/dL    HDL 32 (L) 40 - 75 mg/dL    LDL Cholesterol 73.6 63.0 - 159.0 mg/dL    HDL/Cholesterol Ratio 20.0 20.0 - 50.0 %    Total Cholesterol/HDL Ratio 5.0 2.0 - 5.0    Non-HDL Cholesterol 128 mg/dL       Future Appointments   Date Time Provider Department Center   4/9/2024  4:20 PM Mikey Morelos III, MD [facility] [place]   3/10/2025  2:00 PM Mikey Morelos III, MD [facility]  [place]         Medication List with Changes/Refills   Current Medications    COLCHICINE, GOUT, (COLCRYS) 0.6 MG TABLET    Take 1 tablet (0.6 mg total) by mouth 2 (two) times daily. Take 2 tabs at the first sign of symptoms. Take one tab twice a day til symptoms resolution         Disclaimer:  This note has been generated using voice-recognition software. There may be grammatical or spelling errors that have been missed during proof-reading   Visit complexity inherent to evaluation and management associated with medical care services that serve as the continuing focal point for all needed health care services and/or with medical care services that are part of ongoing care related to a patient's single, serious condition or a complex condition

## 2024-03-08 LAB — RPR SER QL: NORMAL

## 2024-03-13 ENCOUNTER — HOSPITAL ENCOUNTER (OUTPATIENT)
Dept: RADIOLOGY | Facility: HOSPITAL | Age: 29
Discharge: HOME | End: 2024-03-13
Attending: PHYSICIAN ASSISTANT
Payer: COMMERCIAL

## 2024-03-13 ENCOUNTER — OFFICE VISIT (OUTPATIENT)
Dept: ORTHOPEDICS | Facility: CLINIC | Age: 29
End: 2024-03-13
Payer: COMMERCIAL

## 2024-03-13 DIAGNOSIS — M79.642 LEFT HAND PAIN: Primary | ICD-10-CM

## 2024-03-13 DIAGNOSIS — S62.663A CLOSED NONDISPLACED FRACTURE OF DISTAL PHALANX OF LEFT MIDDLE FINGER, INITIAL ENCOUNTER: Primary | ICD-10-CM

## 2024-03-13 DIAGNOSIS — M79.646 PAIN OF MIDDLE FINGER: ICD-10-CM

## 2024-03-13 DIAGNOSIS — M79.642 LEFT HAND PAIN: ICD-10-CM

## 2024-03-13 PROCEDURE — 3044F HG A1C LEVEL LT 7.0%: CPT | Mod: CPTII,S$GLB,, | Performed by: PHYSICIAN ASSISTANT

## 2024-03-13 PROCEDURE — 99999 PR PBB SHADOW E&M-EST. PATIENT-LVL III: CPT | Mod: PBBFAC,,, | Performed by: PHYSICIAN ASSISTANT

## 2024-03-13 PROCEDURE — 99203 OFFICE O/P NEW LOW 30 MIN: CPT | Mod: S$GLB,,, | Performed by: PHYSICIAN ASSISTANT

## 2024-03-13 PROCEDURE — 1159F MED LIST DOCD IN RCRD: CPT | Mod: CPTII,S$GLB,, | Performed by: PHYSICIAN ASSISTANT

## 2024-03-13 RX ORDER — MELOXICAM 15 MG/1
15 TABLET ORAL DAILY
Qty: 30 TABLET | Refills: 0 | Status: SHIPPED | OUTPATIENT
Start: 2024-03-13 | End: 2024-04-29

## 2024-03-13 NOTE — PROGRESS NOTES
[facility]  History & Physical  Orthopedics    SUBJECTIVE:      Chief Complaint: Left long finger    Referring Provider: Mikey Morelos III, MD Dr. Dunbar is the supervising physician for this encounter/patient    History of Present Illness:  Patient is a 28 y.o. right hand dominant male who presents today with complaints of left long finger injury occurred about 6 weeks ago when playing with his child. He did not have this seen by his PCM until about 4 weeks from the injury, xrays confirmed middle phalanx fracture. He has been hakeem taping while at work. Joint remains swollen, he does not take anything for this.     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    Onset of symptoms/DOI was 6 weeks.    Symptoms are aggravated by activity and movement.    Symptoms are alleviated by rest.    Symptoms consist of swelling and decreased ROM.    The patient rates their pain as a 6/10.    Attempted treatment(s) and/or interventions include activity modifications, rest.     The patient denies any fevers, chills, N/V, D/C and presents for evaluation.       No past medical history on file.  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 Take 2 tabs at the first sign of symptoms. Take one tab twice a day til symptoms resolution, Disp: 60 tablet, Rfl: 0    meloxicam (MOBIC) 15 MG tablet, Take 1 tablet (15 mg total) by mouth once daily., Disp: 30 tablet, Rfl: 0      Review of Systems:  Constitutional: no fever or chills  Eyes: no visual changes  ENT: no nasal congestion or sore throat  Respiratory: no cough or shortness of breath  Cardiovascular: no chest pain  Gastrointestinal: no nausea or vomiting, tolerating diet  Musculoskeletal: pain, soreness, and decreased ROM    OBJECTIVE:      Vital Signs (Most Recent):  There were no vitals filed for this visit.  There is no height or weight on file to calculate BMI.      Physical Exam:  Constitutional: The patient appears well-developed and well-nourished. No distress.   Skin: No lesions appreciated  Head: Normocephalic and atraumatic.   Nose: Nose normal.   Ears: No deformities seen  Eyes: Conjunctivae and EOM are normal.   Neck: No tracheal deviation present.   Cardiovascular: Normal rate and intact distal pulses.    Pulmonary/Chest: Effort normal. No respiratory distress.   Abdominal: There is no guarding.   Neurological: The patient is alert.   Psychiatric: The patient has a normal mood and affect.     Left Hand/Wrist Examination:    Observation/Inspection:  Swelling  Long PIP    Deformity  none  Discoloration  none     Scars   none    Atrophy  none    HAND/WRIST EXAMINATION:  Finkelstein's Test   Neg  WHAT Test    Neg  Snuff box tenderness   Neg  Lowry's Test    Neg  Hook of Hamate Tenderness  Neg  CMC grind    Neg  Circumduction test   Neg  Mild TTP to the volar long PIP    Neurovascular Exam:  Digits WWP, brisk CR < 3s throughout  NVI motor/LTS to M/R/U nerves, radial pulse 2+  Tinel's Test - Carpal Tunnel  Neg  Tinel's Test - Cubital Tunnel  Neg  Phalen's Test    Neg  Median Nerve Compression Test Neg    ROM hand full extension of the long finger, decreased flexion due  to edema.    ROM wrist full, painless    ROM elbow full, painless    Abdomen not guarded  Respirations nonlabored  Perfusion intact    Diagnostic Results:     Imaging - I independently viewed the patient's imaging as well as the radiology report.      Impression:     Age-indeterminate fracture volar aspect of base of LEFT 3rd middle phalanx with articular extension.  Correlation advised.      ASSESSMENT/PLAN:      28 y.o. yo male with Left long finger middle phalanx avulsion fracture, 6 weeks from injury    Plan: The patient and I had a thorough discussion today.  We discussed the working diagnosis as well as several other potential alternative diagnoses.  Treatment options were discussed, no surgical indications at this time.     OT ordered for edema and ROM/strengthening. Mobic 15mg ordered. We discuss heat and working on motion. RTC in 2-3 weeks for finger films and motion check.    Should the patient's symptoms worsen, persist, or fail to improve they should return for reevaluation and I would be happy to see them back anytime.           Please do not hesitate to reach out to us via email, phone, or MyChart with any questions, concerns, or feedback.

## 2024-03-19 ENCOUNTER — CLINICAL SUPPORT (OUTPATIENT)
Dept: REHABILITATION | Facility: HOSPITAL | Age: 29
End: 2024-03-19
Payer: COMMERCIAL

## 2024-03-19 DIAGNOSIS — M25.642 DECREASED RANGE OF MOTION OF FINGER OF LEFT HAND: Primary | ICD-10-CM

## 2024-03-19 DIAGNOSIS — S62.663A CLOSED NONDISPLACED FRACTURE OF DISTAL PHALANX OF LEFT MIDDLE FINGER, INITIAL ENCOUNTER: ICD-10-CM

## 2024-03-19 DIAGNOSIS — R29.898 REDUCED HAND STRENGTH: ICD-10-CM

## 2024-03-19 PROCEDURE — 97022 WHIRLPOOL THERAPY: CPT

## 2024-03-19 PROCEDURE — 97110 THERAPEUTIC EXERCISES: CPT

## 2024-03-19 PROCEDURE — 97165 OT EVAL LOW COMPLEX 30 MIN: CPT

## 2024-03-19 NOTE — PATIENT INSTRUCTIONS
"[facility] - OCCUPATIONAL THERAPY  HOME EXERCISE PROGRAM     Complete the following exercises with 10 repetitions each, 4x/day.     AROM: DIP Flexion / Extension  Pinch middle knuckle to prevent bending. Bend end knuckle until stretch is felt.   Hold 3 seconds. Relax. Straighten finger as far as possible.    AROM: PIP Flexion / Extension  Pinch bottom knuckle  to prevent bending. Actively bend middle knuckle until stretch is felt.   Hold 3 seconds. Relax. Straighten finger as far as possible.      AROM: Isolated MCP Flexion / Extension ("Wave")   Bend only your large, bottom knuckles. Hold 3 seconds. Keep the tips of your fingers straight. Straighten fingers.    AROM: Isolated IPJ Flexion / Extension ("Hook")  Bend only your middle and end knuckles. Hold 3 seconds.   Straighten your fingers.     AROM: MCP and PIP Flexion / Extension ("Straight Fist")  Bend your bottom and middle knuckles, keeping the tips of your fingers straight. Try to touch the pads of your fingers on your palm. Hold 3 seconds. Straighten your fingers.     AROM: Composite Flexion / Extension ("Full Fist")  Bend every joint in your hand into a fist. Hold 3 seconds. Straighten your fingers.     AROM: Composte Extension ("Finger Lifts")  Lift your finger off of the table one at a time. Hold 3 seconds. Relax your finger.    AROM: Abduction / Adduction  With hand flat on table, spread all fingers apart, then bring them together as close as possible.    Copyright © I. All rights reserved.     Therapist: SHIKHA Garcia/JOSSE    [facility]  OCCUPATIONAL THERAPY  HOME EXERCISE PROGRAM     Complete the following strengthening program 1x/day.          2 min           _        "

## 2024-03-20 PROBLEM — R29.898 REDUCED HAND STRENGTH: Status: ACTIVE | Noted: 2024-03-20

## 2024-03-20 PROBLEM — M25.642 DECREASED RANGE OF MOTION OF FINGER OF LEFT HAND: Status: ACTIVE | Noted: 2024-03-20

## 2024-03-20 NOTE — PLAN OF CARE
"  [facility]  Occupational Therapy Initial Evaluation    Date: 3/19/2024  Name: Yassine Cote   Clinic Number: [id number]    Therapy Diagnosis:   Encounter Diagnoses   Name Primary?    Closed nondisplaced fracture of distal phalanx of left middle finger, initial encounter     Decreased range of motion of finger of left hand Yes    Reduced hand strength      Physician: Russo-Digeorge, Jamie L*    Physician Orders: OT Eval and Treat; Left long finger middle phalanx fracture, 6 weeks from injury. Please eval/treat for edema and ROM/strengthening.   Medical Diagnosis: S62.663A (ICD-10-CM) - Closed nondisplaced fracture of distal phalanx of left middle finger, initial encounter   Surgical Procedure and Date: N/A / Date of Injury/Onset: January 2024  Evaluation Date: 3/19/2024  Insurance Authorization Period Expiration: 03/13/2025   Plan of Care Expiration: 6/14/24 (12 weeks)   Date of Return to MD: 4/2/2024   Visit # / Visits authorized: 1 / 1  FOTO: completed     Precautions:  Standard    Time In: 3:10 pm  Time Out: 3:56 pm  Total Appointment Time (timed & untimed codes): 46 minutes    SUBJECTIVE     Date of Onset: January 2024    History of Current Condition/Mechanism of Injury: Per PA note on 3/13/24, "left long finger injury occurred about 6 weeks ago when playing with his child. He did not have this seen by his PCM until about 4 weeks from the injury, xrays confirmed middle phalanx fracture. He has been hakeem taping while at work. Joint remains swollen, he does not take anything for this." Yassine reports: anything that requires pushing or pressure hurts. Can't make a fist. Difficulty carrying groceries.    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     Current Functional Status   Home/Living environment: lives with their family      Limitation of Functional Status as follows:   ADLs/IADLs:     - Feeding: Independent    - Bathing: Independent    - Dressing/Grooming: Mod I favoring R hand     - Driving: Mod I favoring R hand      Leisure: football, eating, playing with his kids     Pain:  Functional Pain Scale Rating 0-10: Current 2/10, worst 8/10, best 2/10   Location: L LF  Description: Burning, Tight, and Sharp  Aggravating Factors: Flexing and impact/pressure  Easing Factors: rest     Patient's Goals for Therapy: make a fist     Medical History:   No past medical history on file.    Surgical History:    has a past surgical history that includes Tonsillectomy and adenoidectomy.    Medications:   has a current medication list which includes the following prescription(s): colchicine and meloxicam.    Allergies:   Review of patient's allergies indicates:  No Known Allergies     OBJECTIVE     Observation/Appearance: L LF resting in flexed posture.     Edema. Measured in centimeters.   3/19/2024 3/19/2024    Left Right   Long:       P1 7.4 7.0    PIP 7.5 7.0   P2 6.7 6.5     Elbow and Wrist ROM. WFLs - all planes of motion.     Hand ROM. Measured in degrees.   3/19/2024 3/19/2024    Left Right        Index:  WFL WFL        Long:  MP 0/75 0/75              PIP -15/75 0/90              DIP 0/45 0/80              NELSON 180 245        Ring:    WFL WFL        Small:   WFL WFL        Thumb:             Opposition WFL WFL      Strength (Dynamometer) and Pinch Strength (Pinch Gauge)  Measured in pounds to POP.   3/19/2024 3/19/2024    Left Right   Rung II 59 140   3pt Pinch NT NT     Sensation. Pt denies numbness and/or tingling in BUE(s).   3/19/2024 3/19/2024    Left Right   [place] Gabriel     Normal 1.65-2.83 X X   Diminished Light Touch 3.22-3.61     Diminished Protective 3.84-4.31     Loss of Protective 4.56-6.65      Untestable >6.65     2 Point Discrimination     Static     Dynamic       Limitation/Restriction for FOTO Hand Survey    Therapist reviewed FOTO scores for Yassine Cote Sr on 3/19/2024.   FOTO documents entered into [email or web address] - see Media section.    Limitation Score: 48%       Treatment   Total Treatment time (time-based codes) separate from Evaluation: 12 minutes    Yassine received the treatments listed below:     Supervised modalities after being cleared for contradictions: Fluidotherapy - 115 degrees and 50 speed, to L hand/wrist for 8 minutes to increase blood flow and circulation, desensitization and sensory re-education, pain management, and increased tissue extensibility prior to therex. Pt instructed on performing active range of motion exercises while receiving heat to increase active motion.     Therapeutic exercises to develop strength, endurance, ROM, and flexibility for 12 minutes, including:  - AROM in fluidotherapy x 8 min  - DIP/PIP jt blocking  - TGEs  - Finger lifts  - Finger add/abd   - Light gripping with yellow putty     Patient Education and Home Exercises      Education provided:   - Role of OT and POC  - HEP     Written Home Exercises Provided: yes.  Exercises were reviewed and Yassine was able to demonstrate them prior to the end of the session.  Yassine demonstrated good  understanding of the education provided. See EMR under Patient Instructions for exercises provided during therapy sessions.     Pt was advised to perform these exercises free of pain, and to stop performing them if pain occurs.    Patient/Family Education: role of OT, goals for OT, scheduling/cancellations - pt verbalized understanding. Discussed insurance limitations with patient.    ASSESSMENT     Yassine Cote Sr is a 28 y.o. male referred to outpatient occupational therapy and presents with a medical diagnosis of closed nondisplaced fracture of distal phalanx of left middle finger, initial encounter.   Patient presents with the following therapy deficits: Decreased ROM, Decreased  strength, Decreased pinch strength, Decreased muscle strength, Decreased functional hand use, Increased pain, Edema, Joint Stiffness, and Diminished/Impaired Coordination and demonstrates limitations as described in the chart below. Following medical record review it is determined that pt will benefit from occupational therapy services in order to maximize pain free and/or functional use of left hand. The following goals were discussed with the patient and patient is in agreement with them as to be addressed in the treatment plan. The patient's rehab potential is Good.     Anticipated barriers to occupational therapy: none identified at this time   Pt has no cultural, educational or language barriers to learning provided.    Profile and History Assessment of Occupational Performance Level of Clinical Decision Making Complexity Score   Occupational Profile:   Yassine Cote Sr is a 28 y.o. male who lives with their family and is currently employed Yassine Cote Sr has difficulty with  ADLs and IADLs as listed previously, which  Affecting hisdaily functional abilities.      Comorbidities:    has no past medical history on file.    Medical and Therapy History Review:   Expanded               Performance Deficits    Physical:  Joint Mobility  Joint Stability  Muscle Power/Strength  Muscle Endurance  Edema   Strength  Pinch Strength  Fine Motor Coordination  Pain    Cognitive:  No Deficits    Psychosocial:    Habits  Routines  Rituals     Clinical Decision Making:  low    Assessment Process:  Detailed Assessments    Modification/Need for Assistance:  Minimal-Moderate Modifications/Assistance    Intervention Selection:  Several Treatment Options       low  Based on PMHX, co morbidities , data from assessments and functional level of assistance required with task and clinical presentation directly impacting function.          Goals:   The following goals were discussed with the patient and patient is in agreement with them as to be addressed in the treatment plan.   Long Term Goals (LTGs); to be met by discharge.  LTG #1: Pt will report a pain level of 1-3 out of 10 at worst with daily hand use.   LTG #2: Pt will demo improved FOTO score by 15-20 points.   LTG #3: Pt will return to prior level of function for IADLs and household management.     Short Term Goals (STGs); to be met within 4 weeks (4/19/24).  STG #1: Pt will report a pain level of 4-5 out of 10 at worst with daily hand use.  STG #2: Pt will report/demo [place] with ADLs.  STG #3: Pt will demonstrate independence with issued HEP and modalities for pain/symptom management.  STG #4: Pt will demo improved L LF NELSON by 30-50 degrees needed to aid with grasping.  STG #5: Decrease edema of L LF by 0.2-0.5 cm to increase joint mobility/flexibility for hand/arm use.  STG #6: Pt will increase left hand  strength by at least 10 lbs needed to open jars and carry groceries.     PLAN   Plan of Care Certification: 3/19/2024 to 6/14/24 (12 weeks).     Outpatient Occupational Therapy 1-2 times weekly for 12 weeks to include the following interventions: Paraffin, Fluidotherapy, Manual therapy/joint mobilizations, Modalities for pain management, US 3 mhz, Therapeutic exercises/activities., Iontophoresis with 2.0 cc Dexamethasone, Strengthening, Orthotic Fabrication/Fit/Training, Edema Control, Electrical Modalities, Joint Protection, and Energy Conservation.      Tessy Eckert, OTR/L      I CERTIFY THE NEED FOR THESE SERVICES FURNISHED UNDER THIS PLAN OF TREATMENT AND WHILE UNDER MY CARE  Physician's comments:      Physician's Signature: ___________________________________________________

## 2024-03-26 ENCOUNTER — CLINICAL SUPPORT (OUTPATIENT)
Dept: REHABILITATION | Facility: HOSPITAL | Age: 29
End: 2024-03-26
Payer: COMMERCIAL

## 2024-03-26 DIAGNOSIS — R29.898 REDUCED HAND STRENGTH: ICD-10-CM

## 2024-03-26 DIAGNOSIS — M25.642 DECREASED RANGE OF MOTION OF FINGER OF LEFT HAND: Primary | ICD-10-CM

## 2024-03-26 PROCEDURE — 97110 THERAPEUTIC EXERCISES: CPT

## 2024-03-26 PROCEDURE — 97022 WHIRLPOOL THERAPY: CPT

## 2024-03-26 NOTE — PROGRESS NOTES
[facility]  Occupational Therapy Treatment Note    Date: 3/26/2024  Name: Yassine Cote   Clinic Number: [id number]    Therapy Diagnosis:   Encounter Diagnoses   Name Primary?    Decreased range of motion of finger of left hand Yes    Reduced hand strength      Physician: Russo-Digeorge, Jamie L*    Physician Orders: OT Eval and Treat; Left long finger middle phalanx fracture, 6 weeks from injury. Please eval/treat for edema and ROM/strengthening.   Medical Diagnosis: S62.663A (ICD-10-CM) - Closed nondisplaced fracture of distal phalanx of left middle finger, initial encounter   Surgical Procedure and Date: N/A / Date of Injury/Onset: January 2024  Evaluation Date: 3/19/2024  Insurance Authorization Period Expiration: 12/31/2024   Plan of Care Expiration: 6/14/24 (12 weeks)   Date of Return to MD: 4/2/2024   Visit # / Visits authorized: 2 / 21  FOTO: 1/3    Precautions:  Standard    Time In: 11:00 am  Time Out: 11:43 am  Total Billable Time: 43 minutes    SUBJECTIVE     Pt reports: continued difficulty making a fist.  He was compliant with home exercise program given last session.   Response to previous treatment: First treatment  Functional change: none noted yet    Pain: 3/10  Location: left LF    OBJECTIVE   Objective Measures updated at progress report unless specified.    Observation/Appearance: L LF resting in flexed posture.      Edema. Measured in centimeters.    3/19/2024 3/19/2024     Left Right   Long:         P1 7.4 7.0    PIP 7.5 7.0   P2 6.7 6.5      Elbow and Wrist ROM. WFLs - all planes of motion.      Hand ROM. Measured in degrees.    3/19/2024 3/19/2024     Left Right           Index:  WFL WFL           Long:  MP 0/75 0/75              PIP -15/75 0/90              DIP 0/45 0/80              NELSON 180 245           Ring:    WFL WFL           Small:   WFL WFL           Thumb:               Opposition WFL WFL       Strength (Dynamometer) and Pinch Strength (Pinch  Gauge)  Measured in pounds to POP.    3/19/2024 3/19/2024     Left Right   Rung II 59 140   3pt Pinch NT NT      Sensation. Pt denies numbness and/or tingling in BUE(s).    3/19/2024 3/19/2024     Left Right   [place] Gabriel       Normal 1.65-2.83 X X   Diminished Light Touch 3.22-3.61       Diminished Protective 3.84-4.31       Loss of Protective 4.56-6.65       Untestable >6.65       2 Point Discrimination       Static       Dynamic             Treatment     Yassine received the treatments listed below:     Supervised modalities after being cleared for contradictions: Fluidotherapy - 115 degrees and 50 speed, to L hand/wrist for 20 minutes to increase blood flow and circulation, desensitization and sensory re-education, pain management, and increased tissue extensibility prior to therex. Pt instructed on performing active range of motion exercises while receiving heat to increase active motion.     Manual therapy techniques: Soft tissue Mobilization and Friction Massage were applied to the: L LF for 4 minutes, including:  - Performed soft tissue mobilization/massage to L LF to relieve associated soft tissue tightness, improve joint mobility, decrease pain, and improve lymphatic drainage to increase ROM.     Therapeutic exercises to develop strength, endurance, ROM, and flexibility for 39 minutes, including:  - AROM in fluidotherapy x 20 min  - Digit PROM x 5 min  - Finger add/abd, finger lift x 15 reps each  - TGEs x 10 reps each   - Hook to full fist x 20 reps  - DIP/PIP jt blocking 2 x 10 reps each  - Hook dowel roll x 2 min  - Gripping with red putty x 2 min    Patient Education and Home Exercises      Education provided:   - HEP in place, upgraded to red putty for hand strengthening HEP  - Discussed use of moist heat at home   - Progress towards goals     Written Home Exercises Provided: Patient instructed to cont prior HEP.  Exercises were reviewed and Yassine was able to demonstrate them prior to the end of  the session.  Yassine demonstrated good  understanding of the HEP provided. See EMR under Patient Instructions for exercises provided during therapy sessions.      ASSESSMENT     Pt returns for his first follow-up visit this date. He participated well and endorses good adherence to HEP. Remains mostly limited with L LF flexion but was able to make near full fist by end of session. Emphasized use of moist heat at home to improve flexibility. Will progress as tolerated.     Yassine is progressing well towards his goals and there are no updates to goals at this time. Pt prognosis is Excellent.     Pt will continue to benefit from skilled outpatient occupational therapy to address the deficits listed in the problem list on initial evaluation, provide pt/family education and to maximize pt's level of independence in the home and community environment.     Pt's spiritual, cultural and educational needs considered and pt agreeable to plan of care and goals.    Anticipated barriers to occupational therapy: none identified at this time     Goals:   The following goals were discussed with the patient and patient is in agreement with them as to be addressed in the treatment plan.   Long Term Goals (LTGs); to be met by discharge.  LTG #1: Pt will report a pain level of 1-3 out of 10 at worst with daily hand use. progressing not met 3/26/2024  LTG #2: Pt will demo improved FOTO score by 15-20 points. progressing not met 3/26/2024  LTG #3: Pt will return to prior level of function for IADLs and household management. progressing not met 3/26/2024     Short Term Goals (STGs); to be met within 4 weeks (4/19/24).  STG #1: Pt will report a pain level of 4-5 out of 10 at worst with daily hand use. progressing not met 3/26/2024  STG #2: Pt will report/demo [place] with ADLs. progressing not met 3/26/2024  STG #3: Pt will demonstrate independence with issued HEP and modalities for pain/symptom management. progressing not met  3/26/2024  STG #4: Pt will demo improved L LF NELSON by 30-50 degrees needed to aid with grasping. progressing not met 3/26/2024  STG #5: Decrease edema of L LF by 0.2-0.5 cm to increase joint mobility/flexibility for hand/arm use. progressing not met 3/26/2024  STG #6: Pt will increase left hand  strength by at least 10 lbs needed to open jars and carry groceries. progressing not met 3/26/2024    PLAN     Continue skilled occupational therapy with individualized plan of care focusing on maximizing functional use of patient's left hand.    Updates/Grading for next session: progress as tolerated.    Tessy Eckert, OTR/L

## 2024-04-01 DIAGNOSIS — M79.642 LEFT HAND PAIN: Primary | ICD-10-CM

## 2024-04-03 ENCOUNTER — CLINICAL SUPPORT (OUTPATIENT)
Dept: REHABILITATION | Facility: HOSPITAL | Age: 29
End: 2024-04-03
Attending: PHYSICIAN ASSISTANT
Payer: COMMERCIAL

## 2024-04-03 DIAGNOSIS — M25.642 DECREASED RANGE OF MOTION OF FINGER OF LEFT HAND: Primary | ICD-10-CM

## 2024-04-03 DIAGNOSIS — R29.898 REDUCED HAND STRENGTH: ICD-10-CM

## 2024-04-03 PROCEDURE — 97022 WHIRLPOOL THERAPY: CPT

## 2024-04-03 PROCEDURE — 97110 THERAPEUTIC EXERCISES: CPT

## 2024-04-03 NOTE — PROGRESS NOTES
[facility]  Occupational Therapy Treatment Note    Date: 4/3/2024  Name: Yassine Cote   Clinic Number: [id number]    Therapy Diagnosis:   Encounter Diagnoses   Name Primary?    Decreased range of motion of finger of left hand Yes    Reduced hand strength        Physician: Russo-Digeorge, Jamie L*    Physician Orders: OT Eval and Treat; Left long finger middle phalanx fracture, 6 weeks from injury. Please eval/treat for edema and ROM/strengthening.   Medical Diagnosis: S62.663A (ICD-10-CM) - Closed nondisplaced fracture of distal phalanx of left middle finger, initial encounter   Surgical Procedure and Date: N/A / Date of Injury/Onset: January 2024  Evaluation Date: 3/19/2024  Insurance Authorization Period Expiration: 12/31/2024   Plan of Care Expiration: 6/14/24 (12 weeks)   Date of Return to MD: date not set  Visit # / Visits authorized: 3 / 21  FOTO: 1/3    Precautions:  Standard    Time In: 9:00 am  Time Out: 9:38 am  Total Billable Time: 36 minutes    SUBJECTIVE     Pt reports: greater ease making a hook fist but still swollen.   He was compliant with home exercise program given last session.   Response to previous treatment: Good - improving motion  Functional change: improved grasp     Pain: 2-3/10  Location: left LF    OBJECTIVE   Objective Measures updated at progress report unless specified.    Observation/Appearance: L LF resting in flexed posture.      Edema. Measured in centimeters.    3/19/2024 3/19/2024     Left Right   Long:         P1 7.4 7.0    PIP 7.5 7.0   P2 6.7 6.5      Elbow and Wrist ROM. WFLs - all planes of motion.      Hand ROM. Measured in degrees.    3/19/2024 3/19/2024 4/3/2024     Left Right Left            Index:  WFL WFL             Long:  MP 0/75 0/75 0/75              PIP -15/75 0/90 -12/87              DIP 0/45 0/80 0/73              NELSON 180 245 223 (+22)            Ring:    WFL WFL             Small:   WFL WFL             Thumb:                 Opposition WFL WFL        Strength (Dynamometer) and Pinch Strength (Pinch Gauge)  Measured in pounds to POP.    3/19/2024 3/19/2024 4/3/2024     Left Right Left   Rung II 59 140 92   3pt Pinch NT 25 19      Sensation. Pt denies numbness and/or tingling in BUE(s).    3/19/2024 3/19/2024     Left Right   [place] Gabriel       Normal 1.65-2.83 X X   Diminished Light Touch 3.22-3.61       Diminished Protective 3.84-4.31       Loss of Protective 4.56-6.65       Untestable >6.65       2 Point Discrimination       Static       Dynamic             Treatment     Yassine received the treatments listed below:     Supervised modalities after being cleared for contradictions: Fluidotherapy - 115 degrees and 50 speed, to L hand/wrist for 15 minutes to increase blood flow and circulation, desensitization and sensory re-education, pain management, and increased tissue extensibility prior to therex. Pt instructed on performing active range of motion exercises while receiving heat to increase active motion.     Manual therapy techniques: Soft tissue Mobilization and Friction Massage were applied to the: L LF for 2 minutes, including:  - Performed soft tissue mobilization/massage to L LF to relieve associated soft tissue tightness, improve joint mobility, decrease pain, and improve lymphatic drainage to increase ROM.     Therapeutic exercises to develop strength, endurance, ROM, and flexibility for 36 minutes, including:  - Updated objective measurements, see above.   - AROM in fluidotherapy x 15 min  - Digit PROM x 3 min  - Finger add/abd, finger lift x 15 reps each   - DIP joint blocking x 15 reps   - TGEs x 10 reps each   - Hook to full fist x 15 reps   - Hook dowel roll x 2 min   - Issued finger edema sleeve size XL to L LF for edema management, compression, and support. Pt educated on purpose, wear, care, and precautions.   - Gripping with red putty x 2 min @ home    Patient Education and Home Exercises      Education  provided:   - HEP in place  - Discussed use of moist heat at home   - Progress towards goals     Written Home Exercises Provided: Patient instructed to cont prior HEP.  Exercises were reviewed and Yassine was able to demonstrate them prior to the end of the session.  Yassine demonstrated good  understanding of the HEP provided. See EMR under Patient Instructions for exercises provided during therapy sessions.      ASSESSMENT     Pt participated well and endorses good adherence to HEP. Remains mostly limited with tight composite fist and slight extensor lag but demo 22 deg improvement in NELSON and 33 lb increase in  strength per formal assessment. Will progress as tolerated.     Yassine is progressing well towards his goals and there are no updates to goals at this time. Pt prognosis is Excellent.     Pt will continue to benefit from skilled outpatient occupational therapy to address the deficits listed in the problem list on initial evaluation, provide pt/family education and to maximize pt's level of independence in the home and community environment.     Pt's spiritual, cultural and educational needs considered and pt agreeable to plan of care and goals.    Anticipated barriers to occupational therapy: none identified at this time     Goals:   The following goals were discussed with the patient and patient is in agreement with them as to be addressed in the treatment plan.   Long Term Goals (LTGs); to be met by discharge.  LTG #1: Pt will report a pain level of 1-3 out of 10 at worst with daily hand use. progressing not met 4/3/2024  LTG #2: Pt will demo improved FOTO score by 15-20 points. progressing not met 4/3/2024  LTG #3: Pt will return to prior level of function for IADLs and household management. progressing not met 4/3/2024     Short Term Goals (STGs); to be met within 4 weeks (4/19/24).  STG #1: Pt will report a pain level of 4-5 out of 10 at worst with daily hand use. progressing not met 4/3/2024  STG  #2: Pt will report/demo [place] with ADLs. progressing not met 4/3/2024  STG #3: Pt will demonstrate independence with issued HEP and modalities for pain/symptom management. progressing not met 4/3/2024  STG #4: Pt will demo improved L LF NELSON by 30-50 degrees needed to aid with grasping. progressing not met 4/3/2024  STG #5: Decrease edema of L LF by 0.2-0.5 cm to increase joint mobility/flexibility for hand/arm use. progressing not met 4/3/2024  STG #6: Pt will increase left hand  strength by at least 10 lbs needed to open jars and carry groceries. progressing not met 4/3/2024    PLAN     Continue skilled occupational therapy with individualized plan of care focusing on maximizing functional use of patient's left hand.    Updates/Grading for next session: progress as tolerated.    Tessy Eckert OTR/L

## 2024-04-08 NOTE — PROGRESS NOTES
The patient location is: [place], [place]  The chief complaint leading to consultation is: fu labs     Visit type: audio only    Face to Face time with patient: 5   minutes of total time spent on the encounter, which includes face to face time and non-face to face time preparing to see the patient (eg, review of tests), Obtaining and/or reviewing separately obtained history, Documenting clinical information in the electronic or other health record, Independently interpreting results (not separately reported) and communicating results to the patient/family/caregiver, or Care coordination (not separately reported).         Each patient to whom he or she provides medical services by telemedicine is:  (1) informed of the relationship between the physician and patient and the respective role of any other health care provider with respect to management of the patient; and (2) notified that he or she may decline to receive medical services by telemedicine and may withdraw from such care at any time.    Notes:   Assessment:         1. Prediabetes          Plan:           Diagnoses and all orders for this visit:    Prediabetes  Chronic  Improving  Patient is at goal today   I have reviewed lifestyle modification to achieve/maintain goals  We will continue the current medication regimen as listed below  Patient will follow up in 6 months   -     Basic Metabolic Panel; Standing  -     Hemoglobin A1C; Standing  -     Hepatic Function Panel; Standing  -     Lipid Panel; Standing            Fu in 6  monthsr with prelabs cmp, lipids A1c fasting       Subjective:       Patient ID: Yassine Cote Sr is a 28 y.o. male.    Chief Complaint: No chief complaint on file.      Interim Hx  Seen by ortho    Concerns today      Chronic problems     Patient Active Problem List   Diagnosis    Sickle cell trait    Chronic gout without tophus    Prediabetes    Class 3 obesity    Decreased range of motion of finger of left hand    Reduced hand  "strength       HPI    Review of Systems   Constitutional:  Negative for activity change and unexpected weight change.   HENT:  Negative for hearing loss, rhinorrhea and trouble swallowing.    Eyes:  Negative for discharge and visual disturbance.   Respiratory:  Negative for chest tightness and wheezing.    Cardiovascular:  Negative for chest pain and palpitations.   Gastrointestinal:  Negative for blood in stool, constipation, diarrhea and vomiting.   Endocrine: Negative for polydipsia and polyuria.   Genitourinary:  Negative for difficulty urinating, hematuria and urgency.   Musculoskeletal:  Positive for arthralgias. Negative for joint swelling and neck pain.   Neurological:  Negative for weakness and headaches.   Psychiatric/Behavioral:  Negative for confusion and dysphoric mood.              Health Maintenance Due   Topic Date Due    COVID-19 Vaccine (2 - 2023-24 season) 09/01/2023         Objective:     There were no vitals taken for this visit.        3/7/2024     1:28 PM 9/5/2023     3:48 PM 8/8/2023     2:31 PM   Vitals   Height 6' 2" (1.88 m) 6' 2" (1.88 m) 6' 2" (1.88 m)   Weight (lbs) 317.9 314.82 313.05   BMI (kg/m2) 40.8 40.4 40.2                Physical Exam  Nursing note reviewed.   Constitutional:       General: He is not in acute distress.     Appearance: Normal appearance. He is not ill-appearing, toxic-appearing or diaphoretic.   HENT:      Head: Normocephalic.   Eyes:      Conjunctiva/sclera: Conjunctivae normal.   Pulmonary:      Effort: Pulmonary effort is normal. No respiratory distress.   Neurological:      General: No focal deficit present.      Mental Status: He is alert and oriented to person, place, and time.   Psychiatric:         Mood and Affect: Mood normal.         Behavior: Behavior normal.         Thought Content: Thought content normal.         Judgment: Judgment normal.           No results found for this or any previous visit (from the past 336 hour(s)).    Future Appointments "   Date Time Provider Department Center   4/16/2024  2:00 PM Tessy Eckert, OT [facility] [place]   4/23/2024  2:00 PM Tessy Eckert, OT [facility] [place]   4/30/2024  2:00 PM Tessy Eckert, OT [facility] [place]   3/10/2025  2:00 PM Mikey Morelos III, MD [facility]         Medication List with Changes/Refills   Current Medications    COLCHICINE, GOUT, (COLCRYS) 0.6 MG TABLET    Take 1 tablet (0.6 mg total) by mouth 2 (two) times daily. Take 2 tabs at the first sign of symptoms. Take one tab twice a day til symptoms resolution    MELOXICAM (MOBIC) 15 MG TABLET    Take 1 tablet (15 mg total) by mouth once daily.         Disclaimer:  This note has been generated using voice-recognition software. There may be grammatical or spelling errors that have been missed during proof-reading Visit complexity inherent to evaluation and management associated with medical care services that serve as the continuing focal point for all needed health care services and/or with medical care services that are part of ongoing care related to a patient's single, serious condition or a complex condition

## 2024-04-09 ENCOUNTER — CLINICAL SUPPORT (OUTPATIENT)
Dept: REHABILITATION | Facility: HOSPITAL | Age: 29
End: 2024-04-09
Payer: COMMERCIAL

## 2024-04-09 ENCOUNTER — OFFICE VISIT (OUTPATIENT)
Dept: INTERNAL MEDICINE | Facility: CLINIC | Age: 29
End: 2024-04-09
Payer: COMMERCIAL

## 2024-04-09 DIAGNOSIS — R29.898 REDUCED HAND STRENGTH: ICD-10-CM

## 2024-04-09 DIAGNOSIS — R73.03 PREDIABETES: Primary | ICD-10-CM

## 2024-04-09 DIAGNOSIS — M25.642 DECREASED RANGE OF MOTION OF FINGER OF LEFT HAND: Primary | ICD-10-CM

## 2024-04-09 PROCEDURE — 1160F RVW MEDS BY RX/DR IN RCRD: CPT | Mod: CPTII,95,, | Performed by: INTERNAL MEDICINE

## 2024-04-09 PROCEDURE — 97110 THERAPEUTIC EXERCISES: CPT

## 2024-04-09 PROCEDURE — 99441 PR PHYSICIAN TELEPHONE EVALUATION 5-10 MIN: CPT | Mod: 95,,, | Performed by: INTERNAL MEDICINE

## 2024-04-09 PROCEDURE — 3044F HG A1C LEVEL LT 7.0%: CPT | Mod: CPTII,95,, | Performed by: INTERNAL MEDICINE

## 2024-04-09 PROCEDURE — 97022 WHIRLPOOL THERAPY: CPT

## 2024-04-09 PROCEDURE — 1159F MED LIST DOCD IN RCRD: CPT | Mod: CPTII,95,, | Performed by: INTERNAL MEDICINE

## 2024-04-09 NOTE — PROGRESS NOTES
[facility]  Occupational Therapy Treatment Note    Date: 4/9/2024  Name: Yassine Cote   Clinic Number: [id number]    Therapy Diagnosis:   Encounter Diagnoses   Name Primary?    Decreased range of motion of finger of left hand Yes    Reduced hand strength      Physician: Russo-Digeorge, Jamie L*    Physician Orders: OT Eval and Treat; Left long finger middle phalanx fracture, 6 weeks from injury. Please eval/treat for edema and ROM/strengthening.   Medical Diagnosis: S62.663A (ICD-10-CM) - Closed nondisplaced fracture of distal phalanx of left middle finger, initial encounter   Surgical Procedure and Date: N/A / Date of Injury/Onset: January 2024  Evaluation Date: 3/19/2024  Insurance Authorization Period Expiration: 12/31/2024   Plan of Care Expiration: 6/14/24 (12 weeks)   Date of Return to MD: date not set  Visit # / Visits authorized: 4 / 21  FOTO: 2/3    Precautions:  Standard    Time In: 1:50 pm  Time Out: 2:30 pm  Total Billable Time: 40 minutes    SUBJECTIVE     Pt reports: worse pain upon waking.   He was compliant with home exercise program given last session.   Response to previous treatment: Good - improving motion  Functional change: improved grasp, 8 point improvement in FOTO    Pain: 4-5/10  Location: left LF    OBJECTIVE   Objective Measures updated at progress report unless specified.    Observation/Appearance: L LF resting in flexed posture.      Edema. Measured in centimeters.    3/19/2024 3/19/2024     Left Right   Long:         P1 7.4 7.0    PIP 7.5 7.0   P2 6.7 6.5      Elbow and Wrist ROM. WFLs - all planes of motion.      Hand ROM. Measured in degrees.    3/19/2024 3/19/2024 4/3/2024     Left Right Left            Index:  WFL WFL             Long:  MP 0/75 0/75 0/75              PIP -15/75 0/90 -12/87              DIP 0/45 0/80 0/73              NELSON 180 245 223 (+22)            Ring:    WFL WFL             Small:   WFL WFL             Thumb:                 Opposition WFL WFL        Strength (Dynamometer) and Pinch Strength (Pinch Gauge)  Measured in pounds to POP.    3/19/2024 3/19/2024 4/3/2024     Left Right Left   Rung II 59 140 92   3pt Pinch NT 25 19      Sensation. Pt denies numbness and/or tingling in BUE(s).    3/19/2024 3/19/2024     Left Right   [place] Gabriel       Normal 1.65-2.83 X X   Diminished Light Touch 3.22-3.61       Diminished Protective 3.84-4.31       Loss of Protective 4.56-6.65       Untestable >6.65       2 Point Discrimination       Static       Dynamic           CMS Impairment/Limitation/Restriction for FOTO Hand Survey    Therapist reviewed FOTO scores for Yassine Bob Rocael Sr on 4/9/2024.   FOTO documents entered into [employer] - see Media section.    Limitation Score: 40% (8 point improvement)        Treatment     Yassine received the treatments listed below:     Supervised modalities after being cleared for contradictions: Fluidotherapy - 115 degrees and 50 speed, to L hand/wrist for 15 minutes to increase blood flow and circulation, desensitization and sensory re-education, pain management, and increased tissue extensibility prior to therex. Pt instructed on performing active range of motion exercises while receiving heat to increase active motion.     Manual therapy techniques: Soft tissue Mobilization and Friction Massage were applied to the: L LF for 2 minutes, including:  - Performed soft tissue mobilization/massage to L LF to relieve associated soft tissue tightness, improve joint mobility, decrease pain, and improve lymphatic drainage to increase ROM.     Therapeutic exercises to develop strength, endurance, ROM, and flexibility for 38 minutes, including:  - Updated objective measurements, see above.   - AROM in fluidotherapy x 15 min  - Digit PROM x 3 min  - Finger add/abd, finger lift x 15 reps each   - DIP/PIP joint blocking x 15 reps   - Hook to full fist x 15 reps   - Hook dowel roll x 2 min   - Green flexbar: pron/sup x  10 reps each   - Tabletop ext stretch   - Digi-flex, green: isolated LF flexion, comp  2 x 10 reps each  - Gripping with red putty x 2 min @ home    Patient Education and Home Exercises      Education provided:   - HEP in place  - Discussed use of moist heat at home   - Progress towards goals     Written Home Exercises Provided: Patient instructed to cont prior HEP.  Exercises were reviewed and Yassine was able to demonstrate them prior to the end of the session.  Yassine demonstrated good  understanding of the HEP provided. See EMR under Patient Instructions for exercises provided during therapy sessions.      ASSESSMENT     Pt participated well and endorses good adherence to HEP. Remains mostly limited with tight composite fist and slight extensor lag but improving steadily. He demo 8 point improvement in FOTO. Will progress as tolerated.     Yassine is progressing well towards his goals and there are no updates to goals at this time. Pt prognosis is Excellent.     Pt will continue to benefit from skilled outpatient occupational therapy to address the deficits listed in the problem list on initial evaluation, provide pt/family education and to maximize pt's level of independence in the home and community environment.     Pt's spiritual, cultural and educational needs considered and pt agreeable to plan of care and goals.    Anticipated barriers to occupational therapy: none identified at this time     Goals:   The following goals were discussed with the patient and patient is in agreement with them as to be addressed in the treatment plan.   Long Term Goals (LTGs); to be met by discharge.  LTG #1: Pt will report a pain level of 1-3 out of 10 at worst with daily hand use. progressing not met 4/9/2024  LTG #2: Pt will demo improved FOTO score by 15-20 points. progressing not met 4/9/2024  LTG #3: Pt will return to prior level of function for IADLs and household management. progressing not met 4/9/2024     Short  Term Goals (STGs); to be met within 4 weeks (4/19/24).  STG #1: Pt will report a pain level of 4-5 out of 10 at worst with daily hand use. progressing not met 4/9/2024  STG #2: Pt will report/demo [place] with ADLs. progressing not met 4/9/2024  STG #3: Pt will demonstrate independence with issued HEP and modalities for pain/symptom management. progressing not met 4/9/2024  STG #4: Pt will demo improved L LF NELSON by 30-50 degrees needed to aid with grasping. progressing not met 4/9/2024  STG #5: Decrease edema of L LF by 0.2-0.5 cm to increase joint mobility/flexibility for hand/arm use. progressing not met 4/9/2024  STG #6: Pt will increase left hand  strength by at least 10 lbs needed to open jars and carry groceries. Met 4/3/2024    PLAN     Continue skilled occupational therapy with individualized plan of care focusing on maximizing functional use of patient's left hand.    Updates/Grading for next session: progress as tolerated.    Tessy Eckert, OTR/L

## 2024-04-16 ENCOUNTER — CLINICAL SUPPORT (OUTPATIENT)
Dept: REHABILITATION | Facility: HOSPITAL | Age: 29
End: 2024-04-16
Payer: COMMERCIAL

## 2024-04-16 DIAGNOSIS — R29.898 REDUCED HAND STRENGTH: ICD-10-CM

## 2024-04-16 DIAGNOSIS — M25.642 DECREASED RANGE OF MOTION OF FINGER OF LEFT HAND: Primary | ICD-10-CM

## 2024-04-16 PROCEDURE — 97110 THERAPEUTIC EXERCISES: CPT

## 2024-04-16 PROCEDURE — 97022 WHIRLPOOL THERAPY: CPT

## 2024-04-16 NOTE — PROGRESS NOTES
[facility]  Occupational Therapy Treatment Note    Date: 4/16/2024  Name: Yassine Cote   Clinic Number: [id number]    Therapy Diagnosis:   Encounter Diagnoses   Name Primary?    Decreased range of motion of finger of left hand Yes    Reduced hand strength        Physician: Russo-Digeorge, Jamie L*    Physician Orders: OT Eval and Treat; Left long finger middle phalanx fracture, 6 weeks from injury. Please eval/treat for edema and ROM/strengthening.   Medical Diagnosis: S62.663A (ICD-10-CM) - Closed nondisplaced fracture of distal phalanx of left middle finger, initial encounter   Surgical Procedure and Date: N/A / Date of Injury/Onset: January 2024  Evaluation Date: 3/19/2024  Insurance Authorization Period Expiration: 12/31/2024   Plan of Care Expiration: 6/14/24 (12 weeks)   Date of Return to MD: date not set  Visit # / Visits authorized: 5 / 21  FOTO: 2/3    Precautions:  Standard    Time In: 2:00 pm  Time Out: 2:38 pm  Total Billable Time: 38 minutes    SUBJECTIVE     Pt reports: worse pain upon waking.   He was compliant with home exercise program given last session.   Response to previous treatment: Good - improving motion  Functional change: improved grasp, 8 point improvement in FOTO    Pain: 0/10  Location: left LF    OBJECTIVE   Objective Measures updated at progress report unless specified.    Observation/Appearance: L LF resting in flexed posture.      Edema. Measured in centimeters.    3/19/2024 3/19/2024     Left Right   Long:         P1 7.4 7.0    PIP 7.5 7.0   P2 6.7 6.5      Elbow and Wrist ROM. WFLs - all planes of motion.      Hand ROM. Measured in degrees.    3/19/2024 3/19/2024 4/3/2024     Left Right Left            Index:  WFL WFL             Long:  MP 0/75 0/75 0/75              PIP -15/75 0/90 -12/87              DIP 0/45 0/80 0/73              NELSON 180 245 223 (+22)            Ring:    WFL WFL             Small:   WFL WFL             Thumb:                 Opposition WFL WFL        Strength (Dynamometer) and Pinch Strength (Pinch Gauge)  Measured in pounds to POP.    3/19/2024 3/19/2024 4/3/2024     Left Right Left   Rung II 59 140 92   3pt Pinch NT 25 19      Sensation. Pt denies numbness and/or tingling in BUE(s).    3/19/2024 3/19/2024     Left Right   [place] Gabriel       Normal 1.65-2.83 X X   Diminished Light Touch 3.22-3.61       Diminished Protective 3.84-4.31       Loss of Protective 4.56-6.65       Untestable >6.65       2 Point Discrimination       Static       Dynamic           Treatment     Yassine received the treatments listed below:     Supervised modalities after being cleared for contradictions: Fluidotherapy - 115 degrees and 50 speed, to L hand/wrist for 15 minutes to increase blood flow and circulation, desensitization and sensory re-education, pain management, and increased tissue extensibility prior to therex. Pt instructed on performing active range of motion exercises while receiving heat to increase active motion.     Manual therapy techniques: Soft tissue Mobilization and Friction Massage were applied to the: L LF for 4 minutes, including:  - Performed soft tissue mobilization/massage to L LF to relieve associated soft tissue tightness, improve joint mobility, decrease pain, and improve lymphatic drainage to increase ROM.     Therapeutic exercises to develop strength, endurance, ROM, and flexibility for 34 minutes, including:  - AROM in fluidotherapy x 15 min  - Digit PROM x 3 min  - Finger add/abd, finger lift x 15 reps each   - Reverse joint blocks x 15 reps   - DIP/PIP joint blocking x 10 reps   - Hook to full fist x 15 reps   - Hook dowel roll x 2 min   - Blue flexbar: pron/sup, flex/ext, RD/UD x 10 reps each   - Tabletop ext stretch   - Digi-flex, green: isolated LF flexion, comp  2 x 10 reps each   - Gripping with red putty x 2 min @ home    Patient Education and Home Exercises      Education provided:   - HEP in place,  added: reverse joint blocks  - Discussed use of moist heat at home   - Progress towards goals     Written Home Exercises Provided: yes.  Exercises were reviewed and Yassine was able to demonstrate them prior to the end of the session.  Yassine demonstrated good  understanding of the HEP provided. See EMR under Patient Instructions for exercises provided during therapy sessions.      ASSESSMENT     Pt participated well and endorses good adherence to HEP. Remains mostly limited with tight composite fist and slight extensor lag but improving steadily. He demo 8 point improvement in FOTO. Will progress as tolerated.     Yassine is progressing well towards his goals and there are no updates to goals at this time. Pt prognosis is Excellent.     Pt will continue to benefit from skilled outpatient occupational therapy to address the deficits listed in the problem list on initial evaluation, provide pt/family education and to maximize pt's level of independence in the home and community environment.     Pt's spiritual, cultural and educational needs considered and pt agreeable to plan of care and goals.    Anticipated barriers to occupational therapy: none identified at this time     Goals:   The following goals were discussed with the patient and patient is in agreement with them as to be addressed in the treatment plan.   Long Term Goals (LTGs); to be met by discharge.  LTG #1: Pt will report a pain level of 1-3 out of 10 at worst with daily hand use. progressing not met 4/16/2024  LTG #2: Pt will demo improved FOTO score by 15-20 points. progressing not met 4/16/2024  LTG #3: Pt will return to prior level of function for IADLs and household management. progressing not met 4/16/2024     Short Term Goals (STGs); to be met within 4 weeks (4/19/24).  STG #1: Pt will report a pain level of 4-5 out of 10 at worst with daily hand use. progressing not met 4/16/2024  STG #2: Pt will report/demo [place] with ADLs. progressing not  met 4/16/2024  STG #3: Pt will demonstrate independence with issued HEP and modalities for pain/symptom management. progressing not met 4/16/2024  STG #4: Pt will demo improved L LF NELSON by 30-50 degrees needed to aid with grasping. progressing not met 4/16/2024  STG #5: Decrease edema of L LF by 0.2-0.5 cm to increase joint mobility/flexibility for hand/arm use. progressing not met 4/16/2024  STG #6: Pt will increase left hand  strength by at least 10 lbs needed to open jars and carry groceries. Met 4/3/2024    PLAN     Continue skilled occupational therapy with individualized plan of care focusing on maximizing functional use of patient's left hand.    Updates/Grading for next session: progress as tolerated.    Tessy Eckert, OTR/L

## 2024-04-16 NOTE — PATIENT INSTRUCTIONS
[facility], OCCUPATIONAL THERAPY  HOME EXERCISE PROGRAM     Reverse joint blocking: Perform 10 reps each, 4-6 x a day.    Place opposite hand over affected PIPs (2nd finger joint), extend against hand

## 2024-04-23 ENCOUNTER — CLINICAL SUPPORT (OUTPATIENT)
Dept: REHABILITATION | Facility: HOSPITAL | Age: 29
End: 2024-04-23
Payer: COMMERCIAL

## 2024-04-23 DIAGNOSIS — M25.642 DECREASED RANGE OF MOTION OF FINGER OF LEFT HAND: Primary | ICD-10-CM

## 2024-04-23 DIAGNOSIS — R29.898 REDUCED HAND STRENGTH: ICD-10-CM

## 2024-04-23 PROCEDURE — 97022 WHIRLPOOL THERAPY: CPT

## 2024-04-23 PROCEDURE — 97110 THERAPEUTIC EXERCISES: CPT

## 2024-04-23 NOTE — PROGRESS NOTES
[facility]  Occupational Therapy Treatment Note    Date: 4/23/2024  Name: Yassine Cote   Clinic Number: [id number]    Therapy Diagnosis:   Encounter Diagnoses   Name Primary?    Decreased range of motion of finger of left hand Yes    Reduced hand strength      Physician: Russo-Digeorge, Jamie L*    Physician Orders: OT Eval and Treat; Left long finger middle phalanx fracture, 6 weeks from injury. Please eval/treat for edema and ROM/strengthening.   Medical Diagnosis: S62.663A (ICD-10-CM) - Closed nondisplaced fracture of distal phalanx of left middle finger, initial encounter   Surgical Procedure and Date: N/A / Date of Injury/Onset: January 2024  Evaluation Date: 3/19/2024  Insurance Authorization Period Expiration: 12/31/2024   Plan of Care Expiration: 6/14/24 (12 weeks)   Date of Return to MD: date not set  Visit # / Visits authorized: 5 / 21  FOTO: 2/3    Precautions:  Standard    Time In: 2:00 pm  Time Out: 2:46 pm  Total Billable Time: 46 minutes    SUBJECTIVE     Pt reports: worse pain upon waking.   He was compliant with home exercise program given last session.   Response to previous treatment: Good - improving motion  Functional change: improved grasp, 8 point improvement in FOTO    Pain: 2-3/10  Location: left LF    OBJECTIVE   Objective Measures updated at progress report unless specified.    Observation/Appearance: L LF resting in flexed posture.      Edema. Measured in centimeters.    3/19/2024 3/19/2024 4/23/2024     Left Right Left   Long:          P1 7.4 7.0 7.1    PIP 7.5 7.0 7.2   P2 6.7 6.5 6.2      Elbow and Wrist ROM. WFLs - all planes of motion.      Hand ROM. Measured in degrees.    3/19/2024 3/19/2024 4/3/2024 4/23/2024     Left Right Left Left             Index:  WFL WFL               Long:  MP 0/75 0/75 0/75 0/75              PIP -15/75 0/90 -12/87 -3/90              DIP 0/45 0/80 0/73 0/75              NELSON 180 245 223 (+22) 237 (+14)             Ring:     WFL WFL               Small:   WFL WFL               Thumb:                 Opposition WFL WFL         Strength (Dynamometer) and Pinch Strength (Pinch Gauge)  Measured in pounds to POP.    3/19/2024 3/19/2024 4/3/2024 4/23/2024     Left Right Left Left   Rung II 59 140 92 120   3pt Pinch NT 25 19 19      Sensation. Pt denies numbness and/or tingling in BUE(s).    3/19/2024 3/19/2024     Left Right   [place] Gabriel       Normal 1.65-2.83 X X   Diminished Light Touch 3.22-3.61       Diminished Protective 3.84-4.31       Loss of Protective 4.56-6.65       Untestable >6.65       2 Point Discrimination       Static       Dynamic           Treatment     Yassine received the treatments listed below:     Supervised modalities after being cleared for contradictions: Fluidotherapy - 115 degrees and 50 speed, to L hand/wrist for 15 minutes to increase blood flow and circulation, desensitization and sensory re-education, pain management, and increased tissue extensibility prior to therex. Pt instructed on performing active range of motion exercises while receiving heat to increase active motion.     Manual therapy techniques: Soft tissue Mobilization and Friction Massage were applied to the: L LF for 4 minutes, including:  - Performed soft tissue mobilization/massage to L LF to relieve associated soft tissue tightness, improve joint mobility, decrease pain, and improve lymphatic drainage to increase ROM.     Therapeutic exercises to develop strength, endurance, ROM, and flexibility for 42 minutes, including:  - Updated objective measurements, see above.   - AROM in fluidotherapy x 15 min  - Digit PROM x 3 min  - Resisted finger add/abd, finger lift x 15 reps each   - Reverse joint blocks x 15 reps   - DIP/PIP joint blocking x 10 reps   - Hook to full fist x 15 reps   - Hook dowel roll x 2 min   - Blue flexbar: flex/ext, RD/UD x 15 reps each   - Tabletop ext stretch   - Power web, green: finger flexion x 20 reps   -  Digi-flex, blue: isolated LF flexion, comp  2 x 10 reps each   - Gripping with green putty x 2 min @ home    Patient Education and Home Exercises      Education provided:   - HEP in place. Upgraded to green putty for hand strengthening HEP.   - Discussed use of moist heat at home   - Progress towards goals     Written Home Exercises Provided: Patient instructed to cont prior HEP.  Exercises were reviewed and Yassine was able to demonstrate them prior to the end of the session.  Yassine demonstrated good  understanding of the HEP provided. See EMR under Patient Instructions for exercises provided during therapy sessions.      ASSESSMENT     Pt participated well and endorses good adherence to HEP. Remains mostly limited by pain with making tight composite fist. Good improvements in digit flexion,  strength, edema, and with extensor lag per formal assessment. He demo 8 point improvement in FOTO. Will progress as tolerated.     Yassine is progressing well towards his goals and there are no updates to goals at this time. Pt prognosis is Excellent.     Pt will continue to benefit from skilled outpatient occupational therapy to address the deficits listed in the problem list on initial evaluation, provide pt/family education and to maximize pt's level of independence in the home and community environment.     Pt's spiritual, cultural and educational needs considered and pt agreeable to plan of care and goals.    Anticipated barriers to occupational therapy: none identified at this time     Goals:   The following goals were discussed with the patient and patient is in agreement with them as to be addressed in the treatment plan.   Long Term Goals (LTGs); to be met by discharge.  LTG #1: Pt will report a pain level of 1-3 out of 10 at worst with daily hand use. progressing not met 4/23/2024  LTG #2: Pt will demo improved FOTO score by 15-20 points. progressing not met 4/23/2024  LTG #3: Pt will return to prior level of  function for IADLs and household management. progressing not met 4/23/2024     Short Term Goals (STGs); to be met within 4 weeks (4/19/24).  STG #1: Pt will report a pain level of 4-5 out of 10 at worst with daily hand use. progressing not met 4/23/2024  STG #2: Pt will report/demo [place] with ADLs. Met 4/23/2024  STG #3: Pt will demonstrate independence with issued HEP and modalities for pain/symptom management. Met, ongoing 4/23/2024  STG #4: Pt will demo improved L LF NELSON by 30-50 degrees needed to aid with grasping. Met 4/23/2024  STG #5: Decrease edema of L LF by 0.2-0.5 cm to increase joint mobility/flexibility for hand/arm use. Met 4/23/2024  STG #6: Pt will increase left hand  strength by at least 10 lbs needed to open jars and carry groceries. Met 4/3/2024    PLAN     Continue skilled occupational therapy with individualized plan of care focusing on maximizing functional use of patient's left hand.    Updates/Grading for next session: progress as tolerated.    Tessy Eckert, OTR/L

## 2024-04-29 RX ORDER — MELOXICAM 15 MG/1
TABLET ORAL
Qty: 30 TABLET | Refills: 0 | Status: SHIPPED | OUTPATIENT
Start: 2024-04-29

## 2024-04-30 ENCOUNTER — CLINICAL SUPPORT (OUTPATIENT)
Dept: REHABILITATION | Facility: HOSPITAL | Age: 29
End: 2024-04-30
Payer: COMMERCIAL

## 2024-04-30 DIAGNOSIS — R29.898 REDUCED HAND STRENGTH: ICD-10-CM

## 2024-04-30 DIAGNOSIS — M25.642 DECREASED RANGE OF MOTION OF FINGER OF LEFT HAND: Primary | ICD-10-CM

## 2024-04-30 PROCEDURE — 97022 WHIRLPOOL THERAPY: CPT

## 2024-04-30 PROCEDURE — 97110 THERAPEUTIC EXERCISES: CPT

## 2024-04-30 NOTE — PROGRESS NOTES
[facility]  Occupational Therapy Treatment Note    Date: 4/30/2024  Name: Yassine Cote   Clinic Number: [id number]    Therapy Diagnosis:   Encounter Diagnoses   Name Primary?    Decreased range of motion of finger of left hand Yes    Reduced hand strength      Physician: Russo-Digeorge, Jamie L*    Physician Orders: OT Eval and Treat; Left long finger middle phalanx fracture, 6 weeks from injury. Please eval/treat for edema and ROM/strengthening.   Medical Diagnosis: S62.663A (ICD-10-CM) - Closed nondisplaced fracture of distal phalanx of left middle finger, initial encounter   Surgical Procedure and Date: N/A / Date of Injury/Onset: January 2024  Evaluation Date: 3/19/2024  Insurance Authorization Period Expiration: 12/31/2024   Plan of Care Expiration: 6/14/24 (12 weeks)   Date of Return to MD: date not set  Visit # / Visits authorized: 6 / 21  FOTO: 2/3    Precautions:  Standard    Time In: 1:50 pm  Time Out: 2:26 pm  Total Billable Time: 36 minutes    SUBJECTIVE     Pt reports: No pain, just stiff.    He was compliant with home exercise program given last session.   Response to previous treatment: Good - improving motion  Functional change: improved grasp, 8 point improvement in FOTO    Pain: 0/10  Location: left LF    OBJECTIVE   Objective Measures updated at progress report unless specified.    Observation/Appearance: L LF resting in flexed posture.      Edema. Measured in centimeters.    3/19/2024 3/19/2024 4/23/2024     Left Right Left   Long:          P1 7.4 7.0 7.1    PIP 7.5 7.0 7.2   P2 6.7 6.5 6.2      Elbow and Wrist ROM. WFLs - all planes of motion.      Hand ROM. Measured in degrees.    3/19/2024 3/19/2024 4/3/2024 4/23/2024     Left Right Left Left             Index:  WFL WFL               Long:  MP 0/75 0/75 0/75 0/75              PIP -15/75 0/90 -12/87 -3/90              DIP 0/45 0/80 0/73 0/75              NELSON 180 245 223 (+22) 237 (+14)             Ring:     WFL WFL               Small:   WFL WFL               Thumb:                 Opposition WFL WFL         Strength (Dynamometer) and Pinch Strength (Pinch Gauge)  Measured in pounds to POP.    3/19/2024 3/19/2024 4/3/2024 4/23/2024     Left Right Left Left   Rung II 59 140 92 120   3pt Pinch NT 25 19 19      Sensation. Pt denies numbness and/or tingling in BUE(s).    3/19/2024 3/19/2024     Left Right   [place] Gabriel       Normal 1.65-2.83 X X   Diminished Light Touch 3.22-3.61       Diminished Protective 3.84-4.31       Loss of Protective 4.56-6.65       Untestable >6.65       2 Point Discrimination       Static       Dynamic           Treatment     Yassine received the treatments listed below:     Supervised modalities after being cleared for contradictions: Fluidotherapy - 115 degrees and 50 speed, to L hand/wrist for 15 minutes to increase blood flow and circulation, desensitization and sensory re-education, pain management, and increased tissue extensibility prior to therex. Pt instructed on performing active range of motion exercises while receiving heat to increase active motion.     Manual therapy techniques: Soft tissue Mobilization and Friction Massage were applied to the: L LF for 4 minutes, including:  - Performed soft tissue mobilization/massage to L LF to relieve associated soft tissue tightness, improve joint mobility, decrease pain, and improve lymphatic drainage to increase ROM.     Therapeutic exercises to develop strength, endurance, ROM, and flexibility for 32 minutes, including:  - AROM in fluidotherapy x 15 min  - Resisted finger add/abd, finger lift x 15 reps each   - Reverse joint blocks x 15 reps   - DIP/PIP joint blocking x 10 reps   - Hook to full fist x 15 reps   - Hook dowel roll x 2 min   - Blue flexbar: flex/ext, RD/UD x 15 reps each   - Tabletop ext stretch   - Power web, green: finger flexion x 20 reps   - Digi-flex, blue: isolated LF flexion, comp  2 x 10 reps each   -  Gripping with green putty x 2 min @ home    Patient Education and Home Exercises      Education provided:   - HEP in place  - Progress towards goals     Written Home Exercises Provided: Patient instructed to cont prior HEP.  Exercises were reviewed and Yassine was able to demonstrate them prior to the end of the session.  Yassine demonstrated good  understanding of the HEP provided. See EMR under Patient Instructions for exercises provided during therapy sessions.      ASSESSMENT     Pt participated well and endorses good adherence to HEP. Remains mostly limited by pain with making tight composite fist. Good improvements in digit flexion,  strength, edema, and with extensor lag per formal assessment. He demo 8 point improvement in FOTO. Will progress as tolerated.     Yassine is progressing well towards his goals and there are no updates to goals at this time. Pt prognosis is Good.     Pt will continue to benefit from skilled outpatient occupational therapy to address the deficits listed in the problem list on initial evaluation, provide pt/family education and to maximize pt's level of independence in the home and community environment.     Pt's spiritual, cultural and educational needs considered and pt agreeable to plan of care and goals.    Anticipated barriers to occupational therapy: none identified at this time     Goals:   The following goals were discussed with the patient and patient is in agreement with them as to be addressed in the treatment plan.   Long Term Goals (LTGs); to be met by discharge.  LTG #1: Pt will report a pain level of 1-3 out of 10 at worst with daily hand use. progressing not met 4/30/2024  LTG #2: Pt will demo improved FOTO score by 15-20 points. progressing not met 4/30/2024  LTG #3: Pt will return to prior level of function for IADLs and household management. progressing not met 4/30/2024     Short Term Goals (STGs); to be met within 4 weeks (4/19/24).  STG #1: Pt will report a  pain level of 4-5 out of 10 at worst with daily hand use. progressing not met 4/30/2024  STG #2: Pt will report/demo [place] with ADLs. Met 4/23/2024  STG #3: Pt will demonstrate independence with issued HEP and modalities for pain/symptom management. Met, ongoing 4/30/2024  STG #4: Pt will demo improved L LF NELSON by 30-50 degrees needed to aid with grasping. Met 4/23/2024  STG #5: Decrease edema of L LF by 0.2-0.5 cm to increase joint mobility/flexibility for hand/arm use. Met 4/23/2024  STG #6: Pt will increase left hand  strength by at least 10 lbs needed to open jars and carry groceries. Met 4/3/2024    PLAN     Continue skilled occupational therapy with individualized plan of care focusing on maximizing functional use of patient's left hand.    Updates/Grading for next session: progress as tolerated.    Tessy Eckert OTR/L

## 2024-05-14 ENCOUNTER — DOCUMENTATION ONLY (OUTPATIENT)
Dept: REHABILITATION | Facility: HOSPITAL | Age: 29
End: 2024-05-14

## 2024-05-14 NOTE — PROGRESS NOTES
Occupational Therapy: No show of Visit  Date: 05/14/2024    Patient was a no show to today's OT appointment. Yassine Cote Sr did not call to cancel nor reschedule. No charges have been posted today.     Cancel: 1  No show: 1    Therapist: Tessy Eckert OTR/L

## 2025-05-07 DIAGNOSIS — R73.03 PREDIABETES: ICD-10-CM

## 2025-05-13 RX ORDER — COLCHICINE 0.6 MG/1
0.6 TABLET ORAL 2 TIMES DAILY
Qty: 60 TABLET | Refills: 0 | OUTPATIENT
Start: 2025-05-13

## 2025-05-13 NOTE — TELEPHONE ENCOUNTER
No future appointments.  Medication List with Changes/Refills   Current Medications    COLCHICINE, GOUT, (COLCRYS) 0.6 MG TABLET    Take 1 tablet (0.6 mg total) by mouth 2 (two) times daily. Take 2 tabs at the first sign of symptoms. Take one tab twice a day til symptoms resolution    MELOXICAM (MOBIC) 15 MG TABLET    TAKE 1 TABLET(15 MG) BY MOUTH DAILY

## 2025-05-13 NOTE — TELEPHONE ENCOUNTER
Care Due:                  Date            Visit Type   Department     Provider  --------------------------------------------------------------------------------                                EP -                              PRIMARY      [facility] INTERNAL  Last Visit: 03-      CARE ([facility])   MEDICINE       Mikey Morelos  Next Visit: None Scheduled  None         None Found                                                            Last  Test          Frequency    Reason                     Performed    Due Date  --------------------------------------------------------------------------------    Office Visit  15 months..  colchicine,..............  03- 05-    Cr..........  12 months..  colchicine,..............  03- 03-    Uric Acid...  12 months..  colchicine,..............  08- 08-    [employer] Embedded Care Due Messages. Reference number: [id number].   5/13/2025 7:13:01 AM CDT